# Patient Record
Sex: MALE | Race: WHITE | Employment: FULL TIME | URBAN - METROPOLITAN AREA
[De-identification: names, ages, dates, MRNs, and addresses within clinical notes are randomized per-mention and may not be internally consistent; named-entity substitution may affect disease eponyms.]

---

## 2017-08-11 ENCOUNTER — OFFICE VISIT (OUTPATIENT)
Dept: FAMILY MEDICINE CLINIC | Age: 22
End: 2017-08-11
Payer: COMMERCIAL

## 2017-08-11 VITALS
DIASTOLIC BLOOD PRESSURE: 75 MMHG | OXYGEN SATURATION: 96 % | HEART RATE: 100 BPM | HEIGHT: 71 IN | WEIGHT: 203 LBS | TEMPERATURE: 99.7 F | BODY MASS INDEX: 28.42 KG/M2 | SYSTOLIC BLOOD PRESSURE: 127 MMHG | RESPIRATION RATE: 18 BRPM

## 2017-08-11 DIAGNOSIS — R53.83 FATIGUE, UNSPECIFIED TYPE: ICD-10-CM

## 2017-08-11 DIAGNOSIS — K13.79 MUCOCELE OF MOUTH: ICD-10-CM

## 2017-08-11 DIAGNOSIS — K12.0 ORAL APHTHOUS ULCER: ICD-10-CM

## 2017-08-11 DIAGNOSIS — J06.9 UPPER RESPIRATORY TRACT INFECTION, UNSPECIFIED TYPE: ICD-10-CM

## 2017-08-11 DIAGNOSIS — J02.9 SORE THROAT: Primary | ICD-10-CM

## 2017-08-11 LAB — S PYO AG THROAT QL: NORMAL

## 2017-08-11 PROCEDURE — 99214 OFFICE O/P EST MOD 30 MIN: CPT | Performed by: FAMILY MEDICINE

## 2017-08-11 PROCEDURE — 87880 STREP A ASSAY W/OPTIC: CPT | Performed by: FAMILY MEDICINE

## 2017-08-11 RX ORDER — TRIAMCINOLONE ACETONIDE 0.1 %
PASTE (GRAM) DENTAL
Qty: 5 G | Refills: 2 | Status: SHIPPED | OUTPATIENT
Start: 2017-08-11 | End: 2020-02-13 | Stop reason: ALTCHOICE

## 2017-08-11 ASSESSMENT — ENCOUNTER SYMPTOMS
GASTROINTESTINAL NEGATIVE: 1
WHEEZING: 1
ALLERGIC/IMMUNOLOGIC NEGATIVE: 1
EYES NEGATIVE: 1
SORE THROAT: 1

## 2018-12-20 ENCOUNTER — OFFICE VISIT (OUTPATIENT)
Dept: FAMILY MEDICINE CLINIC | Age: 23
End: 2018-12-20
Payer: COMMERCIAL

## 2018-12-20 VITALS
BODY MASS INDEX: 28.98 KG/M2 | HEIGHT: 71 IN | TEMPERATURE: 98.5 F | SYSTOLIC BLOOD PRESSURE: 127 MMHG | HEART RATE: 83 BPM | OXYGEN SATURATION: 98 % | WEIGHT: 207 LBS | DIASTOLIC BLOOD PRESSURE: 85 MMHG | RESPIRATION RATE: 16 BRPM

## 2018-12-20 DIAGNOSIS — K04.7 DENTAL ABSCESS: Primary | ICD-10-CM

## 2018-12-20 PROCEDURE — 99214 OFFICE O/P EST MOD 30 MIN: CPT | Performed by: FAMILY MEDICINE

## 2018-12-20 PROCEDURE — S0077 INJECTION, CLINDAMYCIN PHOSP: HCPCS | Performed by: FAMILY MEDICINE

## 2018-12-20 RX ORDER — IBUPROFEN 800 MG/1
800 TABLET ORAL EVERY 6 HOURS PRN
Qty: 90 TABLET | Refills: 3 | Status: SHIPPED | OUTPATIENT
Start: 2018-12-20 | End: 2020-02-13 | Stop reason: SDUPTHER

## 2018-12-20 RX ORDER — CLINDAMYCIN PHOSPHATE 150 MG/ML
600 INJECTION, SOLUTION INTRAVENOUS ONCE
Status: COMPLETED | OUTPATIENT
Start: 2018-12-20 | End: 2018-12-20

## 2018-12-20 RX ORDER — AMOXICILLIN 250 MG/1
250 CAPSULE ORAL 3 TIMES DAILY
COMMUNITY
End: 2020-02-13 | Stop reason: ALTCHOICE

## 2018-12-20 RX ORDER — IBUPROFEN 800 MG/1
600 TABLET ORAL EVERY 6 HOURS PRN
COMMUNITY

## 2018-12-20 RX ORDER — CLINDAMYCIN HYDROCHLORIDE 150 MG/1
150 CAPSULE ORAL 3 TIMES DAILY
Qty: 21 CAPSULE | Refills: 0 | Status: SHIPPED | OUTPATIENT
Start: 2018-12-20 | End: 2018-12-27

## 2018-12-20 RX ADMIN — CLINDAMYCIN PHOSPHATE 600 MG: 150 INJECTION, SOLUTION INTRAVENOUS at 13:10

## 2018-12-20 ASSESSMENT — ENCOUNTER SYMPTOMS
EYES NEGATIVE: 1
RESPIRATORY NEGATIVE: 1
GASTROINTESTINAL NEGATIVE: 1
ALLERGIC/IMMUNOLOGIC NEGATIVE: 1

## 2018-12-20 ASSESSMENT — PATIENT HEALTH QUESTIONNAIRE - PHQ9
SUM OF ALL RESPONSES TO PHQ9 QUESTIONS 1 & 2: 0
2. FEELING DOWN, DEPRESSED OR HOPELESS: 0
1. LITTLE INTEREST OR PLEASURE IN DOING THINGS: 0
SUM OF ALL RESPONSES TO PHQ QUESTIONS 1-9: 0
SUM OF ALL RESPONSES TO PHQ QUESTIONS 1-9: 0

## 2018-12-20 NOTE — PROGRESS NOTES
normal.   Abdominal: Soft. Bowel sounds are normal.   Musculoskeletal: Normal range of motion. Neurological: He is alert and oriented to person, place, and time. He has normal reflexes. Skin: Skin is warm and dry. Psychiatric: He has a normal mood and affect. His behavior is normal. Judgment and thought content normal.   Nursing note and vitals reviewed. /85 (Site: Left Upper Arm, Position: Sitting, Cuff Size: Large Adult)   Pulse 83   Temp 98.5 °F (36.9 °C) (Oral)   Resp 16   Ht 5' 11\" (1.803 m)   Wt 207 lb (93.9 kg)   SpO2 98%   BMI 28.87 kg/m²     Assessment:       Diagnosis Orders   1. Dental abscess  clindamycin (CLEOCIN) injection 600 mg    clindamycin (CLEOCIN) 150 MG capsule    ibuprofen (ADVIL;MOTRIN) 800 MG tablet             Plan:      Return if symptoms worsen or fail to improve. Patient will follow-up with his dentist when necessary. We will stop the amoxicillin and use clindamycin. No orders of the defined types were placed in this encounter. Orders Placed This Encounter   Medications    clindamycin (CLEOCIN) injection 600 mg    clindamycin (CLEOCIN) 150 MG capsule     Sig: Take 1 capsule by mouth 3 times daily for 7 days     Dispense:  21 capsule     Refill:  0    ibuprofen (ADVIL;MOTRIN) 800 MG tablet     Sig: Take 1 tablet by mouth every 6 hours as needed for Pain     Dispense:  90 tablet     Refill:  3       Patient given educational materials - see patient instructions. Discussed use, benefit, and side effects of prescribed medications. All patientquestions answered. Pt voiced understanding. Reviewed health maintenance. Instructedto continue current medications, diet and exercise. Patient agreed with treatmentplan. Follow up as directed.      Electronically signed by Mariah Pearson MD on 12/20/2018 at 1:07 PM

## 2020-02-12 ENCOUNTER — NURSE TRIAGE (OUTPATIENT)
Dept: OTHER | Facility: CLINIC | Age: 25
End: 2020-02-12

## 2020-02-13 ENCOUNTER — OFFICE VISIT (OUTPATIENT)
Dept: FAMILY MEDICINE CLINIC | Age: 25
End: 2020-02-13
Payer: COMMERCIAL

## 2020-02-13 ENCOUNTER — HOSPITAL ENCOUNTER (OUTPATIENT)
Age: 25
Discharge: HOME | End: 2020-02-15
Payer: COMMERCIAL

## 2020-02-13 ENCOUNTER — HOSPITAL ENCOUNTER (OUTPATIENT)
Dept: GENERAL RADIOLOGY | Age: 25
Discharge: HOME | End: 2020-02-15
Payer: COMMERCIAL

## 2020-02-13 VITALS
HEART RATE: 79 BPM | HEIGHT: 70 IN | DIASTOLIC BLOOD PRESSURE: 72 MMHG | SYSTOLIC BLOOD PRESSURE: 112 MMHG | OXYGEN SATURATION: 98 % | BODY MASS INDEX: 31.64 KG/M2 | TEMPERATURE: 97.9 F | WEIGHT: 221 LBS

## 2020-02-13 PROCEDURE — 73562 X-RAY EXAM OF KNEE 3: CPT

## 2020-02-13 PROCEDURE — 99385 PREV VISIT NEW AGE 18-39: CPT | Performed by: NURSE PRACTITIONER

## 2020-02-13 ASSESSMENT — ENCOUNTER SYMPTOMS
SHORTNESS OF BREATH: 0
SORE THROAT: 0
ABDOMINAL PAIN: 0
CHEST TIGHTNESS: 0
COUGH: 0
ABDOMINAL DISTENTION: 0
RHINORRHEA: 0
CONSTIPATION: 0
BACK PAIN: 0
NAUSEA: 0
DIARRHEA: 0
VOMITING: 0

## 2020-02-13 ASSESSMENT — PATIENT HEALTH QUESTIONNAIRE - PHQ9
2. FEELING DOWN, DEPRESSED OR HOPELESS: 0
SUM OF ALL RESPONSES TO PHQ QUESTIONS 1-9: 0
SUM OF ALL RESPONSES TO PHQ QUESTIONS 1-9: 0
SUM OF ALL RESPONSES TO PHQ9 QUESTIONS 1 & 2: 0
1. LITTLE INTEREST OR PLEASURE IN DOING THINGS: 0

## 2020-02-13 NOTE — PROGRESS NOTES
Visit Information    Have you changed or started any medications since your last visit including any over-the-counter medicines, vitamins, or herbal medicines? no   Are you having any side effects from any of your medications? -  no  Have you stopped taking any of your medications? Is so, why? -  no    Have you seen any other physician or provider since your last visit? No  Have you had any other diagnostic tests since your last visit? No  Have you been seen in the emergency room and/or had an admission to a hospital since we last saw you? No  Have you had your routine dental cleaning in the past 6 months? no    Have you activated your [employer] account? If not, what are your barriers?  Yes     Patient Care Team:  BENNETT Bell - CNP as PCP - General (Nurse Practitioner Family)    Medical History Review  Past Medical, Family, and Social History reviewed and does not contribute to the patient presenting condition    Health Maintenance   Topic Date Due    Flu vaccine (1) 02/13/2021 (Originally 9/1/2019)    Pneumococcal 0-64 years Vaccine (1 of 1 - PPSV23) 02/13/2021 (Originally 10/13/2000)    DTaP/Tdap/Td vaccine (2 - Td) 11/08/2026    Shingles Vaccine (1 of 2) 10/13/2044    HIV screen  Completed    Hepatitis A vaccine  Aged Out    Hepatitis B vaccine  Aged Out    Hib vaccine  Aged Out    HPV vaccine  Aged Out    Meningococcal (ACWY) vaccine  Aged Out    Varicella vaccine  Discontinued

## 2020-02-13 NOTE — LETTER
[facility]  [address]  [facility] [address]  Phone: [phone]  Fax: BENNETT Pacheco CNP        February 13, 2020     Patient: Gin Hagen   YOB: 1995   Date of Visit: 2/13/2020       To Whom it May Concern:    Sabinefabrizio Don was seen in my clinic on 2/13/2020. He {Return to school/sport/work:15549}. If you have any questions or concerns, please don't hesitate to call.     Sincerely,         BENNETT Tyler CNP

## 2020-02-13 NOTE — PROGRESS NOTES
Endocrine: Negative for polydipsia, polyphagia and polyuria. Genitourinary: Negative for difficulty urinating and dysuria. Musculoskeletal: Negative for arthralgias (stable), back pain and myalgias (stable). Right wrist pain, numbness noted to left thumb  Lump on right knee   Skin: Negative for rash. Neurological: Negative for dizziness, weakness, light-headedness and headaches. Hematological: Negative for adenopathy. Psychiatric/Behavioral: Negative for agitation and behavioral problems. The patient is not nervous/anxious. Objective:     Physical Exam  Vitals signs reviewed. Constitutional:       General: He is not in acute distress. Appearance: Normal appearance. HENT:      Head: Normocephalic and atraumatic. Right Ear: External ear normal.      Left Ear: External ear normal.      Nose: Nose normal.      Mouth/Throat:      Mouth: Mucous membranes are moist.      Pharynx: No oropharyngeal exudate or posterior oropharyngeal erythema. Eyes:      Extraocular Movements: Extraocular movements intact. Conjunctiva/sclera: Conjunctivae normal.      Pupils: Pupils are equal, round, and reactive to light. Neck:      Musculoskeletal: Normal range of motion. Cardiovascular:      Rate and Rhythm: Normal rate and regular rhythm. Pulses: Normal pulses. Heart sounds: No murmur. Pulmonary:      Effort: Pulmonary effort is normal. No respiratory distress. Breath sounds: Normal breath sounds. No wheezing, rhonchi or rales. Abdominal:      General: Bowel sounds are normal. There is no distension. Palpations: Abdomen is soft. Musculoskeletal: Normal range of motion. Right wrist: He exhibits tenderness (occasional). He exhibits normal range of motion. Arms:         Legs:    Skin:     General: Skin is warm and dry. Findings: No rash. Neurological:      General: No focal deficit present.       Mental Status: He is alert and oriented to person, place, and time. Deep Tendon Reflexes: Reflexes normal.   Psychiatric:         Behavior: Behavior normal.         I have reviewed all the lab results dating back to 2016. There are some abnormalities that are not critical to the patient's health, but did discuss them with him at this visit    Assessment:      Diagnosis Orders   1. Annual visit for general adult medical examination without abnormal findings  CBC    Comprehensive Metabolic Panel    Hemoglobin A1C    Lipid Panel   2. Acute pain of right knee  XR KNEE RIGHT (3 VIEWS)   3. Right wrist pain     4. Numbness and tingling of left thumb          Plan:     1. Annual visit for general adult medical examination without abnormal findings/ Encounter for annual physical exam  -Will order above noted labs and call with results  - CBC; Future  - Comprehensive Metabolic Panel; Future  - Hemoglobin A1C; Future  - Lipid Panel; Future    Health maintenance recommendations were discussed at length during this visit. Topics include, but are not limited to:    Diet- (healthy well balanced diet full of fruits and vegetables, the avoidance of carbonated drinks, fruit juices and limit alcohol use).  Exercise-(150 minutes of physical activity per week, get your heart rate up to 100 or more).  Skin care- (Use a sunblock on all of your exposed skin, SPF 30 or higher sun screen, Apply 20 to 30 minutes before going out in the sun. Reapply sunblock every 2 hours and after swimming or sweating).  Sleep- (achieve a regular sleep schedule, waking and laying down at the same time each night).  Personal health- (smoking cessation, safe sex, preventative dental health, vision screenings, seatbelt education, and the importance of the flu vaccine).  Psychosocial health- (maintaining a positive focus on life, an having something to look forward to).     We did discuss in detail the and the recommended preventative screening guidelines (HTN, lipid, DM, breast, cervical cancer, prostate, colorectal and osteoporosis). Detailed education was provided on the patient's after visit summary. 2. Acute pain of right knee  -Will order x-ray and call with results  - XR KNEE RIGHT (3 VIEWS); Future    3. Right wrist pain (occasional)  4. Numbness and tingling of left thumb  - Phalen's and Tenel's test both negative  -I did discuss with him that the repetitive movements at work for long periods of time could exacerbate a nerve. I did encourage rest and ice as well as ibuprofen  -The patient was advised that NSAID-type medications have two very important potential side effects: gastrointestinal irritation including hemorrhage and renal injuries. He was asked to take the medication with food and to stop if he experiences any GI upset. I asked him to call for vomiting, abdominal pain or black/bloody stools. The patient expresses understanding of these issues and questions were answered. - Medications, labs, diagnostic studies, consultations and follow-up as documented in this encounter.  Rest of systems unchanged, continue current treatments     BENNETT Valentine-CNP

## 2020-02-13 NOTE — PATIENT INSTRUCTIONS
to 30 minutes before going out in the sun. Reapply sunblock every 2 hours and after swimming or sweating. Iliana Alex can also damage your skin on cool, windy days. Clouds and fog do not filter UV light. Make sure you reapply sunblock every 2 hours. · Avoid the sun in the middle of the day, between 10 AM to 4 PM. Your unprotected skin can be damaged in 15 minutes with direct sun exposure. Personal Health  · If you smoke, STOP. There are many resources available to help you successfully quit. · If you are sexually active, always practice safe sex and wear a condom. · See a dentist every 6 months. · See an eye doctor regularly. · Always wear a seat belt while in car. · Get a flu vaccine annually. · Get a tetanus booster vaccine every 10 years. Psychosocial Health  · Finally, make sure that you always have something to look forward to whether this is a vacation, a special event, or just a weekend off work. Having something to look forward to helps to maintain positive focus and is good for mental health.    Preventative Screening Guidelines   Hypertension (Blood Pressure) screening - every 1-2 years for all men and women regardless of age  [facility] Lipid (Cholesterol) screening - every 5 years for all men and women 28 and over   Diabetes screening - every 3 years for all men and women 39 and over   Breast cancer screening/Mammogram -  annually for women ages 36 and over   Cervical cancer sccreening/PAP smear/HPV testing every 3 years- women 21-65    Colorectal cancer screening (Cologuard 3 years, Colonoscopy 10 years) - for men and women ages 54-65   Prostate cancer screening - for men 48 and over or age 36 for  Americans    Osteoporosis screening - for women ages 72 and over/DXA scan based findings

## 2020-02-14 ENCOUNTER — HOSPITAL ENCOUNTER (OUTPATIENT)
Age: 25
Discharge: HOME | End: 2020-02-14
Payer: COMMERCIAL

## 2020-02-14 LAB
ALBUMIN SERPL-MCNC: 4.8 G/DL (ref 3.5–5.2)
ALBUMIN/GLOBULIN RATIO: NORMAL (ref 1–2.5)
ALP BLD-CCNC: 80 U/L (ref 40–129)
ALT SERPL-CCNC: 24 U/L (ref 5–41)
ANION GAP SERPL CALCULATED.3IONS-SCNC: 14 MMOL/L (ref 9–17)
AST SERPL-CCNC: 16 U/L
BILIRUB SERPL-MCNC: 0.35 MG/DL (ref 0.3–1.2)
BUN BLDV-MCNC: 15 MG/DL (ref 6–20)
BUN/CREAT BLD: NORMAL (ref 9–20)
CALCIUM SERPL-MCNC: 9.5 MG/DL (ref 8.6–10.4)
CHLORIDE BLD-SCNC: 104 MMOL/L (ref 98–107)
CHOLESTEROL/HDL RATIO: 3.9
CHOLESTEROL: 140 MG/DL
CO2: 25 MMOL/L (ref 20–31)
CREAT SERPL-MCNC: 0.79 MG/DL (ref 0.7–1.2)
ESTIMATED AVERAGE GLUCOSE: 88 MG/DL
GFR AFRICAN AMERICAN: >60 ML/MIN
GFR NON-AFRICAN AMERICAN: >60 ML/MIN
GFR SERPL CREATININE-BSD FRML MDRD: NORMAL ML/MIN/{1.73_M2}
GFR SERPL CREATININE-BSD FRML MDRD: NORMAL ML/MIN/{1.73_M2}
GLUCOSE BLD-MCNC: 95 MG/DL (ref 70–99)
HBA1C MFR BLD: 4.7 % (ref 4–6)
HCT VFR BLD CALC: 49.6 % (ref 41–53)
HDLC SERPL-MCNC: 36 MG/DL
HEMOGLOBIN: 17.3 G/DL (ref 13.5–17.5)
LDL CHOLESTEROL: 76 MG/DL (ref 0–130)
MCH RBC QN AUTO: 29.5 PG (ref 26–34)
MCHC RBC AUTO-ENTMCNC: 34.8 G/DL (ref 31–37)
MCV RBC AUTO: 84.7 FL (ref 80–100)
NRBC AUTOMATED: NORMAL PER 100 WBC
PDW BLD-RTO: 13.2 % (ref 11.5–14.9)
PLATELET # BLD: 275 K/UL (ref 150–450)
PMV BLD AUTO: 7.4 FL (ref 6–12)
POTASSIUM SERPL-SCNC: 4.3 MMOL/L (ref 3.7–5.3)
RBC # BLD: 5.85 M/UL (ref 4.5–5.9)
SODIUM BLD-SCNC: 143 MMOL/L (ref 135–144)
TOTAL PROTEIN: 7.1 G/DL (ref 6.4–8.3)
TRIGL SERPL-MCNC: 140 MG/DL
VLDLC SERPL CALC-MCNC: ABNORMAL MG/DL (ref 1–30)
WBC # BLD: 10.6 K/UL (ref 3.5–11)

## 2020-02-14 PROCEDURE — 80053 COMPREHEN METABOLIC PANEL: CPT

## 2020-02-14 PROCEDURE — 83036 HEMOGLOBIN GLYCOSYLATED A1C: CPT

## 2020-02-14 PROCEDURE — 80061 LIPID PANEL: CPT

## 2020-02-14 PROCEDURE — 36415 COLL VENOUS BLD VENIPUNCTURE: CPT

## 2020-02-14 PROCEDURE — 85027 COMPLETE CBC AUTOMATED: CPT

## 2020-02-16 NOTE — PROGRESS NOTES
Carlos Pugh- does this patient still need a work excuse letter? Looks like it automatically went under my name even though he saw you and there were no dates on it. I deleted it just FYI.

## 2020-10-10 ENCOUNTER — HOSPITAL ENCOUNTER (OUTPATIENT)
Dept: GENERAL RADIOLOGY | Age: 25
Discharge: HOME | End: 2020-10-12
Payer: COMMERCIAL

## 2020-10-10 ENCOUNTER — HOSPITAL ENCOUNTER (OUTPATIENT)
Age: 25
Discharge: HOME | End: 2020-10-12
Payer: COMMERCIAL

## 2020-10-10 ENCOUNTER — OFFICE VISIT (OUTPATIENT)
Dept: PRIMARY CARE CLINIC | Age: 25
End: 2020-10-10
Payer: COMMERCIAL

## 2020-10-10 VITALS
HEIGHT: 70 IN | DIASTOLIC BLOOD PRESSURE: 83 MMHG | HEART RATE: 68 BPM | TEMPERATURE: 99.2 F | WEIGHT: 222 LBS | SYSTOLIC BLOOD PRESSURE: 123 MMHG | OXYGEN SATURATION: 98 % | BODY MASS INDEX: 31.78 KG/M2

## 2020-10-10 PROCEDURE — 72040 X-RAY EXAM NECK SPINE 2-3 VW: CPT

## 2020-10-10 PROCEDURE — 99214 OFFICE O/P EST MOD 30 MIN: CPT | Performed by: FAMILY MEDICINE

## 2020-10-10 RX ORDER — CYCLOBENZAPRINE HCL 10 MG
10 TABLET ORAL 3 TIMES DAILY PRN
Qty: 15 TABLET | Refills: 0 | Status: SHIPPED | OUTPATIENT
Start: 2020-10-10 | End: 2021-07-15 | Stop reason: ALTCHOICE

## 2020-10-10 ASSESSMENT — PATIENT HEALTH QUESTIONNAIRE - PHQ9
1. LITTLE INTEREST OR PLEASURE IN DOING THINGS: 0
SUM OF ALL RESPONSES TO PHQ9 QUESTIONS 1 & 2: 0
SUM OF ALL RESPONSES TO PHQ QUESTIONS 1-9: 0
SUM OF ALL RESPONSES TO PHQ QUESTIONS 1-9: 0
2. FEELING DOWN, DEPRESSED OR HOPELESS: 0

## 2020-10-10 ASSESSMENT — ENCOUNTER SYMPTOMS
BACK PAIN: 0
PHOTOPHOBIA: 0
ABDOMINAL PAIN: 0
DIARRHEA: 0
TROUBLE SWALLOWING: 0
VOMITING: 0
VISUAL CHANGE: 0
RESPIRATORY NEGATIVE: 1
NAUSEA: 0

## 2020-10-10 NOTE — PROGRESS NOTES
[address]  [address] [address]  Dept: [phone]  Dept Fax: [phone]    Bernardo Gamble is a 21 y.o. male who presents today for his medical conditions/complaintsas noted below. Say Holly is c/o of Neck Pain (sharp pain from neck into right shoulder x1day)        HPI:     Neck Pain    This is a new problem. The current episode started yesterday. The problem occurs constantly. The problem has been gradually worsening (started having achy neck yesterday and then woke up with more severe pain). The pain is associated with an unknown (Does lift heavy items from time to time but cannot think of specific trigger to this pain. Pain worsened overnight while sleeping) factor. The pain is present in the right side. The quality of the pain is described as cramping and aching. The pain is at a severity of 8/10. The pain is severe. The symptoms are aggravated by twisting and position. Associated symptoms include tingling (shoulder area). Pertinent negatives include no chest pain, fever, numbness, pain with swallowing, photophobia, trouble swallowing, visual change or weakness. He has tried NSAIDs and heat for the symptoms. The treatment provided mild relief.        Past Medical History:   Diagnosis Date    Acute bronchiolitis     Has a history of acute bronchiolitis    Asthma     Past medical history reviewed and pertinent positives/negatives in the HPI    Past Surgical History:   Procedure Laterality Date    OTHER SURGICAL HISTORY      left foot, great toe. toe nail removal 2006    WISDOM TOOTH EXTRACTION         Family History   Problem Relation Age of Onset    High Blood Pressure Mother     COPD Maternal Grandmother        Social History     Tobacco Use    Smoking status: Never Smoker    Smokeless tobacco: Former User    Tobacco comment: former Hookah smoker   Substance Use Topics    Alcohol use: No     Comment: socially Current Outpatient Medications   Medication Sig Dispense Refill    cyclobenzaprine (FLEXERIL) 10 MG tablet Take 1 tablet by mouth 3 times daily as needed for Muscle spasms 15 tablet 0    ibuprofen (ADVIL;MOTRIN) 800 MG tablet Take 600 mg by mouth every 6 hours as needed for Pain        No current facility-administered medications for this visit. No Known Allergies    Health Maintenance   Topic Date Due    HPV vaccine (1 - Male 2-dose series) 10/13/2005    Flu vaccine (1) 09/01/2020    DTaP/Tdap/Td vaccine (2 - Td) 11/08/2026    HIV screen  Completed    Hepatitis A vaccine  Aged Out    Hepatitis B vaccine  Aged Out    Hib vaccine  Aged Out    Meningococcal (ACWY) vaccine  Aged Out    Pneumococcal 0-64 years Vaccine  Aged Out    Varicella vaccine  Discontinued       :      Review of Systems   Constitutional: Negative for chills and fever. HENT: Negative. Negative for trouble swallowing. Eyes: Negative for photophobia. Respiratory: Negative. Cardiovascular: Negative for chest pain. Gastrointestinal: Negative for abdominal pain, diarrhea, nausea and vomiting. Musculoskeletal: Positive for neck pain. Negative for back pain and joint swelling. Skin: Negative for pallor and rash. Neurological: Positive for tingling (shoulder area). Negative for weakness and numbness. Hematological: Negative for adenopathy. Objective:     Physical Exam  Vitals signs and nursing note reviewed. Constitutional:       Appearance: Normal appearance. He is obese. HENT:      Head: Normocephalic and atraumatic. Right Ear: Hearing normal.      Left Ear: Hearing normal.      Nose: Nose normal.      Mouth/Throat:      Lips: Pink. Eyes:      Extraocular Movements: Extraocular movements intact. Conjunctiva/sclera: Conjunctivae normal.   Neck:      Musculoskeletal: Normal range of motion. No muscular tenderness. Cardiovascular:      Rate and Rhythm: Normal rate and regular rhythm.       Heart sounds: Normal heart sounds. Pulmonary:      Effort: Pulmonary effort is normal.      Breath sounds: Normal breath sounds. Musculoskeletal: Normal range of motion. General: No swelling, tenderness (non-tender to palpation), deformity or signs of injury. Comments: Pain of right neck/trapezius is exacerbated with compound movement involving right shoulder and neck as well as turning head to the right side   Lymphadenopathy:      Cervical: No cervical adenopathy. Skin:     General: Skin is warm and dry. Neurological:      Mental Status: He is alert and oriented to person, place, and time. Mental status is at baseline. Psychiatric:         Mood and Affect: Mood normal.         Behavior: Behavior normal.         Thought Content: Thought content normal.         Judgment: Judgment normal.       /83   Pulse 68   Temp 99.2 °F (37.3 °C) (Infrared)   Ht 5' 10\" (1.778 m)   Wt 222 lb (100.7 kg)   SpO2 98%   BMI 31.85 kg/m²     Assessment:       Diagnosis Orders   1. Neck muscle spasm  XR CERVICAL SPINE (2-3 VIEWS)   2. Neck pain  XR CERVICAL SPINE (2-3 VIEWS)       Plan:    May take muscle relaxer, tylenol/motrin, apply heat as needed for neck pain. Try doing neck exercises/stretches several times daily  If pain persists then have xray of neck  If symptoms worsen or do not improve please follow-up with PCP or return to clinic      Orders Placed This Encounter   Procedures    XR CERVICAL SPINE (2-3 VIEWS)     Standing Status:   Future     Standing Expiration Date:   10/10/2021     Order Specific Question:   Reason for exam:     Answer:   neck pain     Orders Placed This Encounter   Medications    cyclobenzaprine (FLEXERIL) 10 MG tablet     Sig: Take 1 tablet by mouth 3 times daily as needed for Muscle spasms     Dispense:  15 tablet     Refill:  0      Patient given educational materials - see patient instructions. Discussed use, benefit, and side effects of prescribed medications.   All patient questions answered. Pt voiced understanding. Patient agreed with treatment plan. Follow up as directed.      Electronicallysigned by Prasad Bhatti MD on 10/10/2020 at 11:07 AM

## 2020-10-10 NOTE — PATIENT INSTRUCTIONS
Patient Education        Neck Pain: Care Instructions  Your Care Instructions     You can have neck pain anywhere from the bottom of your head to the top of your shoulders. It can spread to the upper back or arms. Injuries, painting a ceiling, sleeping with your neck twisted, staying in one position for too long, and many other activities can cause neck pain. Most neck pain gets better with home care. Your doctor may recommend medicine to relieve pain or relax your muscles. He or she may suggest exercise and physical therapy to increase flexibility and relieve stress. You may need to wear a special (cervical) collar to support your neck for a day or two. Follow-up care is a key part of your treatment and safety. Be sure to make and go to all appointments, and call your doctor if you are having problems. It's also a good idea to know your test results and keep a list of the medicines you take. How can you care for yourself at home? · Try using a heating pad on a low or medium setting for 15 to 20 minutes every 2 or 3 hours. Try a warm shower in place of one session with the heating pad. · You can also try an ice pack for 10 to 15 minutes every 2 to 3 hours. Put a thin cloth between the ice and your skin. · Take pain medicines exactly as directed. ? If the doctor gave you a prescription medicine for pain, take it as prescribed. ? If you are not taking a prescription pain medicine, ask your doctor if you can take an over-the-counter medicine. · If your doctor recommends a cervical collar, wear it exactly as directed. When should you call for help? Call your doctor now or seek immediate medical care if:    · You have new or worsening numbness in your arms, buttocks or legs.     · You have new or worsening weakness in your arms or legs. (This could make it hard to stand up.)     · You lose control of your bladder or bowels.    Watch closely for changes in your health, and be sure to contact your doctor if:    · Your neck pain is getting worse.     · You are not getting better after 1 week.     · You do not get better as expected. Where can you learn more? Go to https://chpearmaan.Booshaka. org and sign in to your [employer] account. Enter [id number] in the [facility] box to learn more about \"Neck Pain: Care Instructions. \"     If you do not have an account, please click on the \"Sign Up Now\" link. Current as of: March 2, 2020               Content Version: 12.6  © 2006-2020 [email or web address], [employer]. Care instructions adapted under license by [facility] ([facility]). If you have questions about a medical condition or this instruction, always ask your healthcare professional. [place] [address] any warranty or liability for your use of this information. Patient Education        Neck: Exercises  Introduction  Here are some examples of exercises for you to try. The exercises may be suggested for a condition or for rehabilitation. Start each exercise slowly. Ease off the exercises if you start to have pain. You will be told when to start these exercises and which ones will work best for you. How to do the exercises  Neck stretch   1. This stretch works best if you keep your shoulder down as you lean away from it. To help you remember to do this, start by relaxing your shoulders and lightly holding on to your thighs or your chair. 2. Tilt your head toward your shoulder and hold for 15 to 30 seconds. Let the weight of your head stretch your muscles. 3. If you would like a little added stretch, use your hand to gently and steadily pull your head toward your shoulder. For example, keeping your right shoulder down, lean your head to the left. 4. Repeat 2 to 4 times toward each shoulder. Diagonal neck stretch   1. Turn your head slightly toward the direction you will be stretching, and tilt your head diagonally toward your chest and hold for 15 to 30 seconds.   2. If you would like a little added stretch, use your hand to gently and steadily pull your head forward on the diagonal.  3. Repeat 2 to 4 times toward each side. Dorsal glide stretch   The dorsal glide stretches the back of the neck. If you feel pain, do not glide so far back. Some people find this exercise easier to do while lying on their backs with an ice pack on the neck. 1. Sit or stand tall and look straight ahead. 2. Slowly tuck your chin as you glide your head backward over your body  3. Hold for a count of 6, and then relax for up to 10 seconds. 4. Repeat 8 to 12 times. Chest and shoulder stretch   1. Sit or stand tall and glide your head backward as in the dorsal glide stretch. 2. Raise both arms so that your hands are next to your ears. 3. Take a deep breath, and as you breathe out, lower your elbows down and behind your back. You will feel your shoulder blades slide down and together, and at the same time you will feel a stretch across your chest and the front of your shoulders. 4. Hold for about 6 seconds, and then relax for up to 10 seconds. 5. Repeat 8 to 12 times. Strengthening: Hands on head   1. Move your head backward, forward, and side to side against gentle pressure from your hands, holding each position for about 6 seconds. 2. Repeat 8 to 12 times. Follow-up care is a key part of your treatment and safety. Be sure to make and go to all appointments, and call your doctor if you are having problems. It's also a good idea to know your test results and keep a list of the medicines you take. Where can you learn more? Go to https://CloudRunner I/OmillaTripbod.iAdvize. org and sign in to your [employer] account. Enter P975 in the [employer] box to learn more about \"Neck: Exercises. \"     If you do not have an account, please click on the \"Sign Up Now\" link. Current as of: March 2, 2020               Content Version: 12.6  © 1094-8716 [employer].    Care instructions adapted under license by [facility] ([facility]). If you have questions about a medical condition or this instruction, always ask your healthcare professional. [address] any warranty or liability for your use of this information. May take muscle relaxer, tylenol/motrin, apply heat as needed for neck pain.   Try doing neck exercises/stretches several times daily  If pain persists over next few days despite muscle relaxer and other symptomatic treatment then have xray of neck  If symptoms worsen or do not improve please follow-up with PCP or return to clinic

## 2020-10-10 NOTE — LETTER
[address] [place] [id number]  Phone: [phone]  Fax: [phone]    Zahra Arzate MD        October 10, 2020     Patient: Rod العلي   YOB: 1995   Date of Visit: 10/10/2020       To Whom it May Concern:    Cristian Roberts was seen in my clinic on 10/10/2020. He is to be excused from work 10/10/20-10/11/20. If you have any questions or concerns, please don't hesitate to call.     Sincerely,           Zahra Arzate MD

## 2021-01-06 ENCOUNTER — NURSE TRIAGE (OUTPATIENT)
Dept: OTHER | Facility: CLINIC | Age: 26
End: 2021-01-06

## 2021-01-06 NOTE — TELEPHONE ENCOUNTER
out of the country in the last month? \" (e.g., travel history, exposures)        Denies    Protocols used: BREATHING DIFFICULTY-ADULT-[place]    Patient called pre-service center [facility]) to schedule appointment, with red flag complaint, transferred to RN access for triage. See above questions and answers. Caller talking full sentences without any distress on phone. Discussed disposition and patient agreeable. Discussed potential consequences for not following disposition recommendation. Aware to call back with any concerns or persistent, worsening, or new symptoms develop. Warm transfer to [facility] scheduling for appointment. Attention Provider: Thank you for allowing me to participate in the care of your patient. The  patient was connected to triage in response to information provided to the ECC. Please do not respond through this encounter as the response is not directed to a shared pool.

## 2021-07-15 ENCOUNTER — OFFICE VISIT (OUTPATIENT)
Dept: FAMILY MEDICINE CLINIC | Age: 26
End: 2021-07-15
Payer: COMMERCIAL

## 2021-07-15 VITALS
BODY MASS INDEX: 32.07 KG/M2 | SYSTOLIC BLOOD PRESSURE: 136 MMHG | DIASTOLIC BLOOD PRESSURE: 88 MMHG | TEMPERATURE: 98.1 F | HEIGHT: 70 IN | HEART RATE: 74 BPM | WEIGHT: 224 LBS | OXYGEN SATURATION: 98 %

## 2021-07-15 DIAGNOSIS — Z00.00 PREVENTATIVE HEALTH CARE: Primary | ICD-10-CM

## 2021-07-15 DIAGNOSIS — R53.83 FATIGUE, UNSPECIFIED TYPE: ICD-10-CM

## 2021-07-15 DIAGNOSIS — J45.20 INTERMITTENT ASTHMA, UNSPECIFIED ASTHMA SEVERITY, UNSPECIFIED WHETHER COMPLICATED: ICD-10-CM

## 2021-07-15 PROCEDURE — G8417 CALC BMI ABV UP PARAM F/U: HCPCS | Performed by: NURSE PRACTITIONER

## 2021-07-15 PROCEDURE — G8427 DOCREV CUR MEDS BY ELIG CLIN: HCPCS | Performed by: NURSE PRACTITIONER

## 2021-07-15 PROCEDURE — 99214 OFFICE O/P EST MOD 30 MIN: CPT | Performed by: NURSE PRACTITIONER

## 2021-07-15 PROCEDURE — 1036F TOBACCO NON-USER: CPT | Performed by: NURSE PRACTITIONER

## 2021-07-15 RX ORDER — ALBUTEROL SULFATE 90 UG/1
2 AEROSOL, METERED RESPIRATORY (INHALATION) 4 TIMES DAILY PRN
Qty: 3 INHALER | Refills: 1 | Status: SHIPPED | OUTPATIENT
Start: 2021-07-15

## 2021-07-15 RX ORDER — BUDESONIDE AND FORMOTEROL FUMARATE DIHYDRATE 160; 4.5 UG/1; UG/1
2 AEROSOL RESPIRATORY (INHALATION) 2 TIMES DAILY
Qty: 3 INHALER | Refills: 1 | Status: SHIPPED | OUTPATIENT
Start: 2021-07-15 | End: 2021-08-05 | Stop reason: ALTCHOICE

## 2021-07-15 SDOH — ECONOMIC STABILITY: FOOD INSECURITY: WITHIN THE PAST 12 MONTHS, THE FOOD YOU BOUGHT JUST DIDN'T LAST AND YOU DIDN'T HAVE MONEY TO GET MORE.: NEVER TRUE

## 2021-07-15 SDOH — ECONOMIC STABILITY: FOOD INSECURITY: WITHIN THE PAST 12 MONTHS, YOU WORRIED THAT YOUR FOOD WOULD RUN OUT BEFORE YOU GOT MONEY TO BUY MORE.: NEVER TRUE

## 2021-07-15 ASSESSMENT — ENCOUNTER SYMPTOMS
SORE THROAT: 0
BACK PAIN: 0
DIARRHEA: 0
ABDOMINAL PAIN: 0
RHINORRHEA: 0
SHORTNESS OF BREATH: 1
VOMITING: 0
WHEEZING: 0
COUGH: 1
CONSTIPATION: 0
ABDOMINAL DISTENTION: 0
NAUSEA: 0
CHEST TIGHTNESS: 0

## 2021-07-15 ASSESSMENT — PATIENT HEALTH QUESTIONNAIRE - PHQ9
2. FEELING DOWN, DEPRESSED OR HOPELESS: 0
SUM OF ALL RESPONSES TO PHQ QUESTIONS 1-9: 0
SUM OF ALL RESPONSES TO PHQ QUESTIONS 1-9: 0
1. LITTLE INTEREST OR PLEASURE IN DOING THINGS: 0
SUM OF ALL RESPONSES TO PHQ9 QUESTIONS 1 & 2: 0
SUM OF ALL RESPONSES TO PHQ QUESTIONS 1-9: 0

## 2021-07-15 ASSESSMENT — SOCIAL DETERMINANTS OF HEALTH (SDOH): HOW HARD IS IT FOR YOU TO PAY FOR THE VERY BASICS LIKE FOOD, HOUSING, MEDICAL CARE, AND HEATING?: NOT HARD AT ALL

## 2021-07-15 NOTE — PROGRESS NOTES
Katie Carlos, APRN-CNP  [address] [facility]  [address] [address], [address]  [address] [address]  Dept: [phone]  Dept Fax: [phone]     PATIENT ID: Vonnie Carrera is a 31 y.o. male. HPI:  Established pt here today for an acute visit secondary to increasing shortness of breath and dry cough. 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 He did have a pulmonary function test back in 2016 for similar complaints and it was negative. He relates that although the study read normal, he felt he didn't do well with it. He also relates that he tested positive for COVID back in October of last year and since then, his symptoms have worsened. He has used his albuterol inhaler a couple of times but relates that it is . Pt denies any fever or chills. Pt today denies any HA, chest pain, or SOB. Pt denies any N/V/D/C or abdominal pain. He also complains of increasing fatigue. My previous office notes, labs and diagnostic studies were reviewed prior to and during encounter. The patient's past medical, surgical, social, and family history as well as current medications and allergies were reviewed as documented in today's encounter by KANDY Davila. Current Outpatient Medications on File Prior to Visit   Medication Sig Dispense Refill    ibuprofen (ADVIL;MOTRIN) 800 MG tablet Take 600 mg by mouth every 6 hours as needed for Pain        No current facility-administered medications on file prior to visit. SUBJECTIVE:     Review of Systems   Constitutional: Positive for fever. Negative for activity change and fatigue. HENT: Negative for congestion, ear pain, rhinorrhea and sore throat. Respiratory: Positive for cough and shortness of breath (with exertion). Negative for chest tightness and wheezing (denies).     Cardiovascular: Negative for chest pain and palpitations. Gastrointestinal: Negative for abdominal distention, abdominal pain, constipation, diarrhea, nausea and vomiting. Endocrine: Negative for polydipsia, polyphagia and polyuria. Genitourinary: Negative for difficulty urinating and dysuria. Musculoskeletal: Negative for arthralgias, back pain and myalgias. Skin: Negative for rash. Allergic/Immunologic: Positive for environmental allergies. Neurological: Negative for dizziness, weakness, light-headedness and headaches. Hematological: Negative for adenopathy. Psychiatric/Behavioral: Negative for agitation and behavioral problems. The patient is not nervous/anxious. OBJECTIVE:      /88   Pulse 74   Temp 98.1 °F (36.7 °C)   Ht 5' 10\" (1.778 m)   Wt 224 lb (101.6 kg)   SpO2 98%   BMI 32.14 kg/m²     Physical Exam  Vitals reviewed. Constitutional:       General: He is not in acute distress. Appearance: Normal appearance. He is well-developed. HENT:      Head: Normocephalic and atraumatic. Right Ear: Hearing and external ear normal.      Left Ear: Hearing and external ear normal.      Nose: Nose normal. No congestion. Right Sinus: No maxillary sinus tenderness or frontal sinus tenderness. Left Sinus: No maxillary sinus tenderness or frontal sinus tenderness. Mouth/Throat:      Lips: Pink. No lesions. Mouth: Mucous membranes are moist. No oral lesions. Tongue: No lesions. Pharynx: Oropharynx is clear. No oropharyngeal exudate or posterior oropharyngeal erythema. Eyes:      Extraocular Movements: Extraocular movements intact. Conjunctiva/sclera: Conjunctivae normal.      Pupils: Pupils are equal, round, and reactive to light. Neck:      Thyroid: No thyroid mass. Cardiovascular:      Rate and Rhythm: Normal rate and regular rhythm. Pulses: Normal pulses. Heart sounds: Normal heart sounds. No murmur heard.      Pulmonary:      Effort: Pulmonary effort is sulfate HFA (VENTOLIN HFA) 108 (90 Base) MCG/ACT inhaler; Inhale 2 puffs into the lungs 4 times daily as needed for Wheezing  Dispense: 3 Inhaler; Refill: 1  - Full PFT Study With Bronchodilator; Future  - budesonide-formoterol (SYMBICORT) 160-4.5 MCG/ACT AERO; Inhale 2 puffs into the lungs 2 times daily  Dispense: 3 Inhaler; Refill: 1    2. Fatigue, unspecified type  - Will order above noted labs and discuss them with him at his follow up  - Vitamin D 25 Hydroxy; Future  - TSH with Reflex; Future  - Iron and TIBC; Future  - Vitamin B12 & Folate; Future    3. Preventative health care  - We did discuss in detail the and the recommended preventative screening guidelines (HTN, lipid, DM). - Will order above noted labs and call with results  - Will continue to follow annually and PRN   - CBC; Future  - Comprehensive Metabolic Panel; Future  - Hemoglobin A1C; Future  - Lipid Panel; Future    - Rest of systems unchanged, continue current treatments. - On this date July 15, 2021,  I have spent greater than 50% of this visit reviewing previous notes, test results and/or face to face with the patient discussing the diagnoses, importance of compliance with the treatment plan, counseling, coordinating care as well as documenting on the day of the visit.      BENNETT Arriaga-CNP

## 2021-07-25 ENCOUNTER — HOSPITAL ENCOUNTER (OUTPATIENT)
Dept: LAB | Age: 26
Setting detail: SPECIMEN
Discharge: HOME | End: 2021-07-25
Payer: COMMERCIAL

## 2021-07-25 DIAGNOSIS — Z01.818 PREOP TESTING: Primary | ICD-10-CM

## 2021-07-25 PROCEDURE — U0003 INFECTIOUS AGENT DETECTION BY NUCLEIC ACID (DNA OR RNA); SEVERE ACUTE RESPIRATORY SYNDROME CORONAVIRUS 2 (SARS-COV-2) (CORONAVIRUS DISEASE [COVID-19]), AMPLIFIED PROBE TECHNIQUE, MAKING USE OF HIGH THROUGHPUT TECHNOLOGIES AS DESCRIBED BY CMS-2020-01-R: HCPCS

## 2021-07-25 PROCEDURE — U0005 INFEC AGEN DETEC AMPLI PROBE: HCPCS

## 2021-07-26 ENCOUNTER — TELEPHONE (OUTPATIENT)
Dept: FAMILY MEDICINE CLINIC | Age: 26
End: 2021-07-26

## 2021-07-26 NOTE — TELEPHONE ENCOUNTER
Patient is not taking inhaler. Patient reporting cough was more of a hassle with taking Symbicort. After he stopped inhaler, the cough went away. Patient asking if he should be on different medication or if he is okay not taking inhaler. If patient is to continue Symbicort, is the cough a normal reaction that will go away?

## 2021-07-26 NOTE — TELEPHONE ENCOUNTER
Pt started taking the Symbicort for 5 days and had a dry cough. Stopped it and a few days later, the cough was gone. He wanted to make [facility] aware of this and wondered if he should be on something else to replace.

## 2021-07-26 NOTE — TELEPHONE ENCOUNTER
Can we call him and get some more information? He had the dry cough at our last visit. The symbicort was prescribed for his asthma exacerbation. I am confused as to what or why he would want something different.

## 2021-07-27 ENCOUNTER — TELEPHONE (OUTPATIENT)
Dept: FAMILY MEDICINE CLINIC | Age: 26
End: 2021-07-27

## 2021-07-27 LAB
SARS-COV-2: NORMAL
SARS-COV-2: NOT DETECTED
SOURCE: NORMAL

## 2021-07-27 NOTE — LETTER
Miguel Hillock, APRN-CNP  [address] [facility]  [address] [address], [address]  [facility] [address]  Dept: [phone]  Dept Fax: [phone]  July 27, 2021     Say Soto Michael  1995      To Whom it May Concern,     Say Holly was seen and treated in our office. Please excuse him from work on 7/26/2021. If you have any questions or concerns, please don't hesitate to call the office at the above number.     Sincerely,      DEVIN Bender

## 2021-07-27 NOTE — TELEPHONE ENCOUNTER
Patient called stating he needs excuse for work on yesterday b/c he to get a covid-19 test for his PFT On Thursday.

## 2021-07-29 ENCOUNTER — HOSPITAL ENCOUNTER (OUTPATIENT)
Dept: PULMONOLOGY | Age: 26
Discharge: HOME | End: 2021-07-29
Payer: COMMERCIAL

## 2021-07-29 DIAGNOSIS — J45.20 INTERMITTENT ASTHMA, UNSPECIFIED ASTHMA SEVERITY, UNSPECIFIED WHETHER COMPLICATED: ICD-10-CM

## 2021-07-29 PROCEDURE — 94640 AIRWAY INHALATION TREATMENT: CPT

## 2021-07-29 PROCEDURE — 94729 DIFFUSING CAPACITY: CPT

## 2021-07-29 PROCEDURE — 94060 EVALUATION OF WHEEZING: CPT

## 2021-07-29 PROCEDURE — 94726 PLETHYSMOGRAPHY LUNG VOLUMES: CPT

## 2021-07-29 PROCEDURE — 94664 DEMO&/EVAL PT USE INHALER: CPT

## 2021-08-03 ENCOUNTER — HOSPITAL ENCOUNTER (OUTPATIENT)
Age: 26
Discharge: HOME | End: 2021-08-03
Payer: COMMERCIAL

## 2021-08-03 DIAGNOSIS — Z00.00 PREVENTATIVE HEALTH CARE: ICD-10-CM

## 2021-08-03 DIAGNOSIS — R53.83 FATIGUE, UNSPECIFIED TYPE: ICD-10-CM

## 2021-08-03 LAB
ALBUMIN SERPL-MCNC: 4.8 G/DL (ref 3.5–5.2)
ALBUMIN/GLOBULIN RATIO: ABNORMAL (ref 1–2.5)
ALP BLD-CCNC: 78 U/L (ref 40–129)
ALT SERPL-CCNC: 30 U/L (ref 5–41)
ANION GAP SERPL CALCULATED.3IONS-SCNC: 7 MMOL/L (ref 9–17)
AST SERPL-CCNC: 21 U/L
BILIRUB SERPL-MCNC: 0.49 MG/DL (ref 0.3–1.2)
BUN BLDV-MCNC: 13 MG/DL (ref 6–20)
BUN/CREAT BLD: ABNORMAL (ref 9–20)
CALCIUM SERPL-MCNC: 9.6 MG/DL (ref 8.6–10.4)
CHLORIDE BLD-SCNC: 104 MMOL/L (ref 98–107)
CHOLESTEROL/HDL RATIO: 3.7
CHOLESTEROL: 140 MG/DL
CO2: 28 MMOL/L (ref 20–31)
CREAT SERPL-MCNC: 0.81 MG/DL (ref 0.7–1.2)
ESTIMATED AVERAGE GLUCOSE: 97 MG/DL
FOLATE: 15.6 NG/ML
GFR AFRICAN AMERICAN: >60 ML/MIN
GFR NON-AFRICAN AMERICAN: >60 ML/MIN
GFR SERPL CREATININE-BSD FRML MDRD: ABNORMAL ML/MIN/{1.73_M2}
GFR SERPL CREATININE-BSD FRML MDRD: ABNORMAL ML/MIN/{1.73_M2}
GLUCOSE BLD-MCNC: 100 MG/DL (ref 70–99)
HBA1C MFR BLD: 5 % (ref 4–6)
HCT VFR BLD CALC: 46.3 % (ref 41–53)
HDLC SERPL-MCNC: 38 MG/DL
HEMOGLOBIN: 16.2 G/DL (ref 13.5–17.5)
IRON SATURATION: 45 % (ref 20–55)
IRON: 116 UG/DL (ref 59–158)
LDL CHOLESTEROL: 84 MG/DL (ref 0–130)
MCH RBC QN AUTO: 29.2 PG (ref 26–34)
MCHC RBC AUTO-ENTMCNC: 35 G/DL (ref 31–37)
MCV RBC AUTO: 83.5 FL (ref 80–100)
NRBC AUTOMATED: NORMAL PER 100 WBC
PDW BLD-RTO: 13.2 % (ref 11.5–14.9)
PLATELET # BLD: 281 K/UL (ref 150–450)
PMV BLD AUTO: 7.3 FL (ref 6–12)
POTASSIUM SERPL-SCNC: 4.4 MMOL/L (ref 3.7–5.3)
RBC # BLD: 5.55 M/UL (ref 4.5–5.9)
SODIUM BLD-SCNC: 139 MMOL/L (ref 135–144)
TOTAL IRON BINDING CAPACITY: 260 UG/DL (ref 250–450)
TOTAL PROTEIN: 7.1 G/DL (ref 6.4–8.3)
TRIGL SERPL-MCNC: 92 MG/DL
TSH SERPL DL<=0.05 MIU/L-ACNC: 1.3 MIU/L (ref 0.3–5)
UNSATURATED IRON BINDING CAPACITY: 144 UG/DL (ref 112–347)
VITAMIN B-12: 799 PG/ML (ref 232–1245)
VITAMIN D 25-HYDROXY: 19.9 NG/ML (ref 30–100)
VLDLC SERPL CALC-MCNC: ABNORMAL MG/DL (ref 1–30)
WBC # BLD: 7.3 K/UL (ref 3.5–11)

## 2021-08-03 PROCEDURE — 80053 COMPREHEN METABOLIC PANEL: CPT

## 2021-08-03 PROCEDURE — 83540 ASSAY OF IRON: CPT

## 2021-08-03 PROCEDURE — 85027 COMPLETE CBC AUTOMATED: CPT

## 2021-08-03 PROCEDURE — 83036 HEMOGLOBIN GLYCOSYLATED A1C: CPT

## 2021-08-03 PROCEDURE — 84443 ASSAY THYROID STIM HORMONE: CPT

## 2021-08-03 PROCEDURE — 83550 IRON BINDING TEST: CPT

## 2021-08-03 PROCEDURE — 36415 COLL VENOUS BLD VENIPUNCTURE: CPT

## 2021-08-03 PROCEDURE — 82746 ASSAY OF FOLIC ACID SERUM: CPT

## 2021-08-03 PROCEDURE — 82607 VITAMIN B-12: CPT

## 2021-08-03 PROCEDURE — 80061 LIPID PANEL: CPT

## 2021-08-03 PROCEDURE — 82306 VITAMIN D 25 HYDROXY: CPT

## 2021-08-03 NOTE — PROCEDURES
[address] [facility] [address]                               PULMONARY FUNCTION    PATIENT NAME: Lars Burks                :        1995  MED REC NO:   [id number]                             ROOM:  ACCOUNT NO:   [de-identified]                           ADMIT DATE: 2021  PROVIDER:     Marylu Mcgowan    DATE OF PROCEDURE:  2021    Spirometry is normal.  FEV1 106% predicted with 7% bronchodilator change  to 114% predicted. % predicted with 2% bronchodilator change to  111% predicted. FEV1 to FVC ratio post bronchodilator 86. Total lung capacity by body box 114% predicted, % predicted. Diffusion capacity corrected for alveolar volume, 142% predicted. Airway resistance normal.    FINAL IMPRESSION:  Normal PFT.   Clinical correlation advised        JOHN JOSE    D: 2021 13:53:54       T: 2021 14:00:34     /[email or web address]  Job#: [id number]     Doc#: [id number]    CC:

## 2021-08-05 ENCOUNTER — OFFICE VISIT (OUTPATIENT)
Dept: FAMILY MEDICINE CLINIC | Age: 26
End: 2021-08-05
Payer: MEDICARE

## 2021-08-05 VITALS
OXYGEN SATURATION: 98 % | BODY MASS INDEX: 32.71 KG/M2 | DIASTOLIC BLOOD PRESSURE: 82 MMHG | TEMPERATURE: 98.4 F | SYSTOLIC BLOOD PRESSURE: 130 MMHG | HEART RATE: 70 BPM | WEIGHT: 228 LBS

## 2021-08-05 DIAGNOSIS — E55.9 VITAMIN D DEFICIENCY: ICD-10-CM

## 2021-08-05 DIAGNOSIS — R03.0 BLOOD PRESSURE ELEVATED WITHOUT HISTORY OF HTN: Primary | ICD-10-CM

## 2021-08-05 DIAGNOSIS — J45.20 INTERMITTENT ASTHMA, UNSPECIFIED ASTHMA SEVERITY, UNSPECIFIED WHETHER COMPLICATED: ICD-10-CM

## 2021-08-05 PROCEDURE — G8417 CALC BMI ABV UP PARAM F/U: HCPCS | Performed by: NURSE PRACTITIONER

## 2021-08-05 PROCEDURE — 1036F TOBACCO NON-USER: CPT | Performed by: NURSE PRACTITIONER

## 2021-08-05 PROCEDURE — G8427 DOCREV CUR MEDS BY ELIG CLIN: HCPCS | Performed by: NURSE PRACTITIONER

## 2021-08-05 PROCEDURE — 99213 OFFICE O/P EST LOW 20 MIN: CPT | Performed by: NURSE PRACTITIONER

## 2021-08-05 RX ORDER — FLUTICASONE PROPIONATE 44 UG/1
2 AEROSOL, METERED RESPIRATORY (INHALATION) 2 TIMES DAILY
Qty: 1 INHALER | Refills: 3 | Status: SHIPPED | OUTPATIENT
Start: 2021-08-05 | End: 2021-12-08

## 2021-08-05 RX ORDER — ERGOCALCIFEROL 1.25 MG/1
50000 CAPSULE ORAL WEEKLY
Qty: 24 CAPSULE | Refills: 0 | Status: SHIPPED | OUTPATIENT
Start: 2021-08-05 | End: 2021-12-14 | Stop reason: ALTCHOICE

## 2021-08-05 ASSESSMENT — ENCOUNTER SYMPTOMS
ABDOMINAL PAIN: 0
ABDOMINAL DISTENTION: 0
CONSTIPATION: 0
SORE THROAT: 0
VOMITING: 0
BACK PAIN: 0
COUGH: 1
CHEST TIGHTNESS: 0
SHORTNESS OF BREATH: 1
RHINORRHEA: 0
NAUSEA: 0
DIARRHEA: 0

## 2021-08-05 NOTE — PROGRESS NOTES
Katie Carlos, APRN-CNP  [address] [facility]  [address] [address]  [address] [address]  Dept: [phone]  Dept Fax: [phone]     PATIENT ID: Vonnie Carrera is a 31 y.o. male. HPI:  Established pt here today for f/u on chronic medical problems; elevated BP, asthma, go over labs and/or diagnostic studies, and medication refills. Pt denies any fever or chills. Pt today denies any HA, chest pain, or SOB. Pt denies any N/V/D/C or abdominal pain. Today, patient is doing well on current tx and voices no concerns. My previous office notes, labs and diagnostic studies were reviewed prior to and during encounter. The patient's past medical, surgical, social, and family history as well as current medications and allergies were reviewed as documented in today's encounter by KANDY Davila. Current Outpatient Medications on File Prior to Visit   Medication Sig Dispense Refill    albuterol sulfate HFA (VENTOLIN HFA) 108 (90 Base) MCG/ACT inhaler Inhale 2 puffs into the lungs 4 times daily as needed for Wheezing 3 Inhaler 1    ibuprofen (ADVIL;MOTRIN) 800 MG tablet Take 600 mg by mouth every 6 hours as needed for Pain        No current facility-administered medications on file prior to visit. SUBJECTIVE:     Review of Systems   Constitutional: Negative for activity change, fatigue and fever. HENT: Negative for congestion, ear pain, rhinorrhea and sore throat. Respiratory: Positive for cough (intermittent) and shortness of breath (ongoing; intermittent). Negative for chest tightness. Cardiovascular: Negative for chest pain and palpitations. Gastrointestinal: Negative for abdominal distention, abdominal pain, constipation, diarrhea, nausea and vomiting. Endocrine: Negative for polydipsia, polyphagia and polyuria. Genitourinary: Negative for difficulty urinating and dysuria.    Musculoskeletal: Negative for arthralgias, back pain and myalgias. Skin: Negative for rash. Neurological: Negative for dizziness, weakness, light-headedness and headaches. Hematological: Negative for adenopathy. Psychiatric/Behavioral: Negative for agitation and behavioral problems. The patient is not nervous/anxious. OBJECTIVE:  /82   Pulse 70   Temp 98.4 °F (36.9 °C)   Wt 228 lb (103.4 kg)   SpO2 98%   BMI 32.71 kg/m²     Physical Exam  Vitals and nursing note reviewed. HENT:      Head: Normocephalic and atraumatic. Cardiovascular:      Rate and Rhythm: Normal rate and regular rhythm. Heart sounds: Normal heart sounds. No murmur heard. Pulmonary:      Effort: Pulmonary effort is normal. No respiratory distress. Breath sounds: Normal breath sounds. Chest:      Chest wall: No tenderness. Abdominal:      General: Bowel sounds are normal.      Palpations: Abdomen is soft. Tenderness: There is no abdominal tenderness. Musculoskeletal:         General: Normal range of motion. Cervical back: Normal range of motion. Skin:     General: Skin is warm and dry. Findings: No rash. Neurological:      Mental Status: He is alert and oriented to person, place, and time. ASSESSMENT:   Diagnosis Orders   1. Blood pressure elevated without history of HTN     2. Intermittent asthma, unspecified asthma severity, unspecified whether complicated     3. Vitamin D deficiency       PLAN:  1. Blood pressure elevated without history of HTN  - BP improved this visit  - 130/82  - Will continue to monitor    2. Intermittent asthma, unspecified asthma severity, unspecified whether complicated  - PFT noted   - Normal study  - Tried Symbicort but made him cough more so he stopped it  - Will try Flovent    3.  Vitamin D deficiency  - Vitamin D noted (19.9) and is low  - I have sent a twice weekly supplement for 12 weeks of 50,000 IU of vitamin D  - Please increase foods with Vitamin D, which include cheese, eggs, cereals, yogurt, milk, tofu, any type of beef, salmon or tuna,  spinach, and mushroom. - Will re-check after completion       - Rest of systems unchanged, continue current treatments. - On this date August 5, 2021,  I have spent greater than 50% of this visit reviewing previous notes, test results and/or face to face with the patient discussing the diagnoses, importance of compliance with the treatment plan, counseling, coordinating care as well as documenting on the day of the visit.      Ruben Vides, APRN-CNP

## 2021-08-12 ENCOUNTER — PATIENT MESSAGE (OUTPATIENT)
Dept: FAMILY MEDICINE CLINIC | Age: 26
End: 2021-08-12

## 2021-08-12 DIAGNOSIS — J45.20 INTERMITTENT ASTHMA, UNSPECIFIED ASTHMA SEVERITY, UNSPECIFIED WHETHER COMPLICATED: Primary | ICD-10-CM

## 2021-08-12 NOTE — TELEPHONE ENCOUNTER
From: Olga Cuevas  To: Coleen Vides, APRN - NP  Sent: 8/12/2021 9:51 AM EDT  Subject: Non-Urgent Medical Question    Hi. I was just wondering if my asthma would make me eligible for fmla and if so how would i go about starting the paperwork.

## 2021-12-08 RX ORDER — FLUTICASONE PROPIONATE 44 MCG
AEROSOL WITH ADAPTER (GRAM) INHALATION
Qty: 10.6 G | Refills: 3 | Status: SHIPPED | OUTPATIENT
Start: 2021-12-08

## 2021-12-14 ENCOUNTER — OFFICE VISIT (OUTPATIENT)
Dept: FAMILY MEDICINE CLINIC | Age: 26
End: 2021-12-14
Payer: COMMERCIAL

## 2021-12-14 VITALS
TEMPERATURE: 98.6 F | DIASTOLIC BLOOD PRESSURE: 82 MMHG | SYSTOLIC BLOOD PRESSURE: 120 MMHG | BODY MASS INDEX: 32.57 KG/M2 | HEART RATE: 74 BPM | OXYGEN SATURATION: 97 % | WEIGHT: 227 LBS

## 2021-12-14 DIAGNOSIS — N50.812 PAIN IN BOTH TESTICLES: ICD-10-CM

## 2021-12-14 DIAGNOSIS — M54.32 BILATERAL SCIATICA: Primary | ICD-10-CM

## 2021-12-14 DIAGNOSIS — N50.811 PAIN IN BOTH TESTICLES: ICD-10-CM

## 2021-12-14 DIAGNOSIS — M54.31 BILATERAL SCIATICA: Primary | ICD-10-CM

## 2021-12-14 PROCEDURE — 1036F TOBACCO NON-USER: CPT | Performed by: NURSE PRACTITIONER

## 2021-12-14 PROCEDURE — G8427 DOCREV CUR MEDS BY ELIG CLIN: HCPCS | Performed by: NURSE PRACTITIONER

## 2021-12-14 PROCEDURE — G8484 FLU IMMUNIZE NO ADMIN: HCPCS | Performed by: NURSE PRACTITIONER

## 2021-12-14 PROCEDURE — G8417 CALC BMI ABV UP PARAM F/U: HCPCS | Performed by: NURSE PRACTITIONER

## 2021-12-14 PROCEDURE — 99213 OFFICE O/P EST LOW 20 MIN: CPT | Performed by: NURSE PRACTITIONER

## 2021-12-14 RX ORDER — PREDNISONE 20 MG/1
TABLET ORAL
Qty: 17 TABLET | Refills: 0 | Status: SHIPPED | OUTPATIENT
Start: 2021-12-14

## 2021-12-14 RX ORDER — CYCLOBENZAPRINE HCL 10 MG
10 TABLET ORAL NIGHTLY PRN
Qty: 10 TABLET | Refills: 0 | Status: SHIPPED | OUTPATIENT
Start: 2021-12-14 | End: 2021-12-24

## 2021-12-14 RX ORDER — IBUPROFEN 800 MG/1
800 TABLET ORAL EVERY 8 HOURS PRN
Qty: 30 TABLET | Refills: 0 | Status: SHIPPED | OUTPATIENT
Start: 2021-12-14 | End: 2021-12-24

## 2021-12-14 ASSESSMENT — ENCOUNTER SYMPTOMS
VOMITING: 0
DIARRHEA: 0
SORE THROAT: 0
CHEST TIGHTNESS: 0
ABDOMINAL DISTENTION: 0
BACK PAIN: 1
CONSTIPATION: 0
ABDOMINAL PAIN: 0
RHINORRHEA: 0
COUGH: 0
NAUSEA: 0
SHORTNESS OF BREATH: 0

## 2021-12-14 NOTE — PROGRESS NOTES
Brandon Hoang, APRN-[address]  [id number]  [address] [address]  Dept: [phone]  Dept Fax: [phone]     PATIENT ID: Todd Jain is a 31 y.o. male. HPI:  Established pt here today for an acute visit secondary to low back pain. He relates that he was recently moving. He was carrying boxes up and down stairs multiple times. He relates that the day after moving, he felt okay but as a couple of days went by his pain has worsened. He relates that he is now having shooting pain radiating down both legs, right worse than left. The pain was also shooting into his testicles. He does have a history of a hydrocele. He did follow with Dr. Michael Velazco back in 2015 and no intervention was recommended. He relates that he has intermittent testicular pain, maybe monthly, that comes and goes. Today, he relates that his testicular pain has resolved. Pt denies any fever or chills. Pt today denies any HA, chest pain, or SOB. Pt denies any N/V/D/C or abdominal pain. He denies any loss of bowel or bladder associated with the back pain    My previous office notes, labs and diagnostic studies were reviewed prior to and during encounter. The patient's past medical, surgical, social, and family history as well as current medications and allergies were reviewed as documented in today's encounter by KANDY Paul. Current Outpatient Medications on File Prior to Visit   Medication Sig Dispense Refill    FLOVENT HFA 44 MCG/ACT inhaler inhale 2 puffs INTO THE LUNGS twice a day 10.6 g 3    albuterol sulfate HFA (VENTOLIN HFA) 108 (90 Base) MCG/ACT inhaler Inhale 2 puffs into the lungs 4 times daily as needed for Wheezing 3 Inhaler 1    ibuprofen (ADVIL;MOTRIN) 800 MG tablet Take 600 mg by mouth every 6 hours as needed for Pain        No current facility-administered medications on file prior to visit.      SUBJECTIVE:     Review of Systems   Constitutional: Negative for activity change, fatigue and fever. HENT: Negative for congestion, ear pain, rhinorrhea and sore throat. Respiratory: Negative for cough, chest tightness and shortness of breath. Cardiovascular: Negative for chest pain and palpitations. Gastrointestinal: Negative for abdominal distention, abdominal pain, constipation, diarrhea, nausea and vomiting. Endocrine: Negative for polydipsia, polyphagia and polyuria. Genitourinary: Negative for difficulty urinating and dysuria. Musculoskeletal: Positive for back pain (with radiation down both legs; right worse than left). Negative for arthralgias and myalgias. Skin: Negative for rash. Neurological: Negative for dizziness, weakness, light-headedness and headaches. Hematological: Negative for adenopathy. Psychiatric/Behavioral: Negative for agitation and behavioral problems. The patient is not nervous/anxious. OBJECTIVE:      /82   Pulse 74   Temp 98.6 °F (37 °C)   Wt 227 lb (103 kg)   SpO2 97%   BMI 32.57 kg/m²     Physical Exam  Vitals reviewed. Constitutional:       General: He is not in acute distress. HENT:      Head: Normocephalic and atraumatic. Pulmonary:      Effort: Pulmonary effort is normal. No respiratory distress. Musculoskeletal:      Comments: Back exam:   Inspection: No visible abnormalities noted. Palpation: Diffuse lumbar spine point tenderness, bilateral lumbar paraspinal muscle tenderness. ROM: Flexion, extension, bending and twisting slightly decreased. Reproducible pain with ROM. Straight leg test: Negative. Strength: 5/5 bilateral lower extremities. DTR (patellar): 2/4 and symmetric. Gait: Normal.    Neurological:      General: No focal deficit present. Mental Status: He is alert and oriented to person, place, and time. Psychiatric:         Mood and Affect: Mood normal.       ASSESSMENT:   Diagnosis Orders   1.  Bilateral sciatica  predniSONE (DELTASONE) 20 MG tablet    cyclobenzaprine (FLEXERIL) 10 MG tablet    ibuprofen (ADVIL;MOTRIN) 800 MG tablet   2. Pain in both testicles       PLAN:  1. Bilateral sciatica  - Steroid taper  - Ibuprofen 800 mg TID  - Flexeril 10 mg HS  - Take prednisone w/food in the AM  - Low back stretching was discussed and provided on patients after visit summary.   - Moist heat locally. - Consider X-ray studies and formal physical therapy if not improving.   - Advised emergent treatment for any sudden loss of bowel or bladder associated with lower back pain, patient verbalized understanding.  - Call or return to clinic prn if these symptoms worsen or fail to improve as anticipated. - predniSONE (DELTASONE) 20 MG tablet; Take 2 tabs daily x 4 days, then 1 tab daily x 6 days, then 1/2 tab daily x 6 days  Dispense: 17 tablet; Refill: 0  - cyclobenzaprine (FLEXERIL) 10 MG tablet; Take 1 tablet by mouth nightly as needed for Muscle spasms  Dispense: 10 tablet; Refill: 0  - ibuprofen (ADVIL;MOTRIN) 800 MG tablet; Take 1 tablet by mouth every 8 hours as needed for Pain  Dispense: 30 tablet; Refill: 0    2. Pain in both testicles  - US reviewed from 2015 as well as Dr. Kendrick Concepcion recommendation.  - He will call the office for follow up as he is hoping to also have a vasectomy.     - Rest of systems unchanged, continue current treatments. - On this date December 14, 2021,  I have spent greater than 50% of this visit reviewing previous notes, test results and/or face to face with the patient discussing the diagnoses, importance of compliance with the treatment plan, counseling, coordinating care as well as documenting on the day of the visit.      Alice Mann, BENNETT-CNP

## 2022-03-25 ENCOUNTER — HOSPITAL ENCOUNTER (OUTPATIENT)
Age: 27
Discharge: HOME | End: 2022-03-27
Payer: COMMERCIAL

## 2022-03-25 ENCOUNTER — HOSPITAL ENCOUNTER (OUTPATIENT)
Dept: GENERAL RADIOLOGY | Age: 27
Discharge: HOME | End: 2022-03-27
Payer: COMMERCIAL

## 2022-03-25 DIAGNOSIS — R06.02 SOB (SHORTNESS OF BREATH): ICD-10-CM

## 2022-03-25 PROCEDURE — 71046 X-RAY EXAM CHEST 2 VIEWS: CPT

## 2022-06-18 NOTE — PATIENT INSTRUCTIONS
Patient Education        Sciatica: Exercises  Introduction  Here are some examples of typical rehabilitation exercises for your condition. Start each exercise slowly. Ease off the exercise if you start to have pain. Your doctor or physical therapist will tell you when you can start these exercises and which ones will work best for you. When you are not being active, find a comfortable position for rest. Some people are comfortable on the floor or a medium-firm bed with a small pillow under their head and another under their knees. Some people prefer to lie on their side with a pillow between their knees. Don't stay in one position for too long. Take short walks (10 to 20 minutes) every 2 to 3 hours. Avoid slopes, hills, and stairs until you feel better. Walk only distances you can manage without pain, especially leg pain. How to do the exercises  Back stretches    1. Get down on your hands and knees on the floor. 2. Relax your head and allow it to droop. Round your back up toward the ceiling until you feel a nice stretch in your upper, middle, and lower back. Hold this stretch for as long as it feels comfortable, or about 15 to 30 seconds. 3. Return to the starting position with a flat back while you are on your hands and knees. 4. Let your back sway by pressing your stomach toward the floor. Lift your buttocks toward the ceiling. 5. Hold this position for 15 to 30 seconds. 6. Repeat 2 to 4 times. Follow-up care is a key part of your treatment and safety. Be sure to make and go to all appointments, and call your doctor if you are having problems. It's also a good idea to know your test results and keep a list of the medicines you take. Where can you learn more? Go to [email or web address]. org and sign in to your [facility] [facility] account. Enter D010 in the [employer] box to learn more about \"Sciatica: Exercises. \"     If you do not have an account, please click on the \"Sign Up Now\" link.  Current as of: July 1, 2021               Content Version: 13.0  © 5368-8327 [employer]. Care instructions adapted under license by [facility] ([facility]). If you have questions about a medical condition or this instruction, always ask your healthcare professional. [place] [address] any warranty or liability for your use of this information.          Dr Bobby Serrano The patient is an 87 year old male with a history of HTN, HL, BPH, CAD s/p CABG, mild chronic systolic heart failure who is admitted with a hip fracture.    Plan:  - ECG with mild conduction abnormalities but no evidence of ischemia or infarction  - Continue atorvastatin 20 mg daily  - As per patient's outpatient cardiologist Dr. De La Rosa, the patient had a cardiac catheterization 7/20/21 with patent LIMA-LAD, patient SVG-RCA, patient SVG-D and an occluded SVG-OM. EF was 45%.  - Reportedly there was an episode of syncope later in evening after fall - will check echo and monitor on telemetry  - Additionally, there was an episode of coffee ground emesis yesterday in hospital - GI re-evaluation. Hemoglobin trending down. If trends down further may need to be transfused.  - Aspirin on hold - resume when ok from a GI standpoint  - BP low  - Increase LR to 100 cc/hr  - Hold metoprolol and losartan due to low BPs  - From a cardiac standpoint, patient may proceed for surgery if echo unremarkable, hemoglobin stabilizes with GI optimization, and BP improves The patient is an 87 year old male with a history of HTN, HL, BPH, CAD s/p CABG, mild chronic systolic heart failure who is admitted with a hip fracture.    Plan:  - ECG with mild conduction abnormalities but no evidence of ischemia or infarction  - Continue atorvastatin 20 mg daily  - As per patient's outpatient cardiologist Dr. De La Rosa, the patient had a cardiac catheterization 7/20/21 with patent LIMA-LAD, patient SVG-RCA, patient SVG-D and an occluded SVG-OM. EF was 45%.  - Reportedly there was an episode of syncope later in evening after fall - will check echo and monitor on telemetry  - Additionally, there was an episode of coffee ground emesis yesterday in hospital - GI re-evaluation. Hemoglobin trending down. If trends down further may need to be transfused.  - Aspirin on hold - resume when ok from a GI standpoint  - BP low  - Increase LR to 100 cc/hr  - Hold metoprolol and losartan due to low BPs  - From a cardiac standpoint, patient may proceed for surgery if echo unremarkable, hemoglobin stabilizes with GI optimization, and BP improves    ADDENDUM:  - Echo images reviewed - technically difficult, LV systolic function is grossly normal, mild aortic stenosis, no pulm HTN (PASP 38 mmHg)

## 2022-09-22 ENCOUNTER — HOSPITAL ENCOUNTER (OUTPATIENT)
Age: 27
Discharge: HOME | End: 2022-09-22

## 2023-03-21 DIAGNOSIS — J45.20 INTERMITTENT ASTHMA, UNSPECIFIED ASTHMA SEVERITY, UNSPECIFIED WHETHER COMPLICATED: ICD-10-CM

## 2023-03-21 RX ORDER — ALBUTEROL SULFATE 90 UG/1
2 AEROSOL, METERED RESPIRATORY (INHALATION) 4 TIMES DAILY PRN
Qty: 1 EACH | Refills: 0 | Status: SHIPPED | OUTPATIENT
Start: 2023-03-21

## 2023-03-30 ENCOUNTER — OFFICE VISIT (OUTPATIENT)
Dept: FAMILY MEDICINE CLINIC | Age: 28
End: 2023-03-30
Payer: COMMERCIAL

## 2023-03-30 VITALS
HEIGHT: 70 IN | BODY MASS INDEX: 32.93 KG/M2 | HEART RATE: 87 BPM | SYSTOLIC BLOOD PRESSURE: 130 MMHG | OXYGEN SATURATION: 97 % | TEMPERATURE: 97.7 F | DIASTOLIC BLOOD PRESSURE: 84 MMHG | WEIGHT: 230 LBS

## 2023-03-30 DIAGNOSIS — J45.20 INTERMITTENT ASTHMA, UNSPECIFIED ASTHMA SEVERITY, UNSPECIFIED WHETHER COMPLICATED: ICD-10-CM

## 2023-03-30 DIAGNOSIS — J40 BRONCHITIS: ICD-10-CM

## 2023-03-30 DIAGNOSIS — Z00.00 PREVENTATIVE HEALTH CARE: ICD-10-CM

## 2023-03-30 DIAGNOSIS — E55.9 VITAMIN D DEFICIENCY: ICD-10-CM

## 2023-03-30 DIAGNOSIS — Z00.00 ANNUAL PHYSICAL EXAM: Primary | ICD-10-CM

## 2023-03-30 PROCEDURE — 99395 PREV VISIT EST AGE 18-39: CPT | Performed by: NURSE PRACTITIONER

## 2023-03-30 RX ORDER — PREDNISONE 20 MG/1
40 TABLET ORAL DAILY
Qty: 5 TABLET | Refills: 0 | Status: SHIPPED | OUTPATIENT
Start: 2023-03-30 | End: 2023-04-04

## 2023-03-30 RX ORDER — AMOXICILLIN 875 MG/1
875 TABLET, COATED ORAL 2 TIMES DAILY
Qty: 20 TABLET | Refills: 0 | Status: SHIPPED | OUTPATIENT
Start: 2023-03-30 | End: 2023-04-09

## 2023-03-30 RX ORDER — FLUTICASONE PROPIONATE AND SALMETEROL 100; 50 UG/1; UG/1
1 POWDER RESPIRATORY (INHALATION) EVERY 12 HOURS
Qty: 3 EACH | Refills: 3 | Status: SHIPPED | OUTPATIENT
Start: 2023-03-30 | End: 2023-06-28

## 2023-03-30 SDOH — ECONOMIC STABILITY: FOOD INSECURITY: WITHIN THE PAST 12 MONTHS, THE FOOD YOU BOUGHT JUST DIDN'T LAST AND YOU DIDN'T HAVE MONEY TO GET MORE.: NEVER TRUE

## 2023-03-30 SDOH — ECONOMIC STABILITY: HOUSING INSECURITY
IN THE LAST 12 MONTHS, WAS THERE A TIME WHEN YOU DID NOT HAVE A STEADY PLACE TO SLEEP OR SLEPT IN A SHELTER (INCLUDING NOW)?: NO

## 2023-03-30 SDOH — ECONOMIC STABILITY: INCOME INSECURITY: HOW HARD IS IT FOR YOU TO PAY FOR THE VERY BASICS LIKE FOOD, HOUSING, MEDICAL CARE, AND HEATING?: NOT HARD AT ALL

## 2023-03-30 SDOH — ECONOMIC STABILITY: FOOD INSECURITY: WITHIN THE PAST 12 MONTHS, YOU WORRIED THAT YOUR FOOD WOULD RUN OUT BEFORE YOU GOT MONEY TO BUY MORE.: NEVER TRUE

## 2023-03-30 ASSESSMENT — ENCOUNTER SYMPTOMS
COUGH: 1
VOMITING: 0
CHEST TIGHTNESS: 0
SHORTNESS OF BREATH: 1
CONSTIPATION: 0
NAUSEA: 0
BACK PAIN: 0
DIARRHEA: 0
ABDOMINAL DISTENTION: 0
ABDOMINAL PAIN: 0
RHINORRHEA: 0
SORE THROAT: 1

## 2023-03-30 ASSESSMENT — PATIENT HEALTH QUESTIONNAIRE - PHQ9
SUM OF ALL RESPONSES TO PHQ QUESTIONS 1-9: 0
1. LITTLE INTEREST OR PLEASURE IN DOING THINGS: 0
SUM OF ALL RESPONSES TO PHQ QUESTIONS 1-9: 0
2. FEELING DOWN, DEPRESSED OR HOPELESS: 0
SUM OF ALL RESPONSES TO PHQ QUESTIONS 1-9: 0
SUM OF ALL RESPONSES TO PHQ QUESTIONS 1-9: 0
SUM OF ALL RESPONSES TO PHQ9 QUESTIONS 1 & 2: 0

## 2023-03-30 NOTE — PROGRESS NOTES
for sore throat. Negative for congestion, ear pain and rhinorrhea. Respiratory:  Positive for cough and shortness of breath. Negative for chest tightness. Cardiovascular:  Negative for chest pain and palpitations. Gastrointestinal:  Negative for abdominal distention, abdominal pain, constipation, diarrhea, nausea and vomiting. Endocrine: Negative for polydipsia, polyphagia and polyuria. Genitourinary:  Negative for difficulty urinating and dysuria. Musculoskeletal:  Negative for arthralgias, back pain and myalgias. Skin:  Negative for rash. Neurological:  Negative for dizziness, weakness, light-headedness and headaches. Hematological:  Negative for adenopathy. Psychiatric/Behavioral:  Negative for agitation and behavioral problems. The patient is not nervous/anxious. OBJECTIVE:  /84   Pulse 87   Temp 97.7 °F (36.5 °C)   Ht 5' 10\" (1.778 m)   Wt 230 lb (104.3 kg)   SpO2 97%   BMI 33.00 kg/m²     Physical Exam  Vitals and nursing note reviewed. Constitutional:       General: He is not in acute distress. Appearance: Normal appearance. He is well-developed. HENT:      Head: Normocephalic and atraumatic. Cardiovascular:      Rate and Rhythm: Normal rate and regular rhythm. Heart sounds: Normal heart sounds. No murmur heard. Pulmonary:      Effort: Pulmonary effort is normal. No respiratory distress. Breath sounds: Normal breath sounds. Decreased air movement present. Chest:      Chest wall: No tenderness. Abdominal:      General: Bowel sounds are normal.      Palpations: Abdomen is soft. Tenderness: There is no abdominal tenderness. Musculoskeletal:         General: Normal range of motion. Cervical back: Normal range of motion. Right lower leg: No edema. Left lower leg: No edema. Skin:     General: Skin is warm and dry. Findings: No rash. Neurological:      Mental Status: He is alert and oriented to person, place, and time.

## 2023-03-31 ENCOUNTER — TELEPHONE (OUTPATIENT)
Dept: FAMILY MEDICINE CLINIC | Age: 28
End: 2023-03-31

## 2023-03-31 RX ORDER — PREDNISONE 20 MG/1
40 TABLET ORAL DAILY
Qty: 10 TABLET | Refills: 0 | Status: SHIPPED | OUTPATIENT
Start: 2023-03-31 | End: 2023-04-05

## 2023-03-31 NOTE — TELEPHONE ENCOUNTER
Pt received rx for Prednisone 20mg to take BID for 5days. Pt only received 5tablets. He is asking what should he do.

## 2023-04-24 ENCOUNTER — HOSPITAL ENCOUNTER (OUTPATIENT)
Age: 28
Discharge: HOME | End: 2023-04-24
Payer: MEDICAID

## 2023-04-24 DIAGNOSIS — Z00.00 PREVENTATIVE HEALTH CARE: ICD-10-CM

## 2023-04-24 DIAGNOSIS — E55.9 VITAMIN D DEFICIENCY: ICD-10-CM

## 2023-04-24 LAB
ALBUMIN SERPL-MCNC: 4.8 G/DL (ref 3.5–5.2)
ALP SERPL-CCNC: 71 U/L (ref 40–129)
ALT SERPL-CCNC: 36 U/L (ref 5–41)
ANION GAP SERPL CALCULATED.3IONS-SCNC: 9 MMOL/L (ref 9–17)
AST SERPL-CCNC: 20 U/L
BILIRUB SERPL-MCNC: 0.3 MG/DL (ref 0.3–1.2)
BUN SERPL-MCNC: 13 MG/DL (ref 6–20)
CALCIUM SERPL-MCNC: 9.8 MG/DL (ref 8.6–10.4)
CHLORIDE SERPL-SCNC: 101 MMOL/L (ref 98–107)
CHOLEST SERPL-MCNC: 161 MG/DL
CHOLESTEROL/HDL RATIO: 3.9
CO2 SERPL-SCNC: 29 MMOL/L (ref 20–31)
CREAT SERPL-MCNC: 0.83 MG/DL (ref 0.7–1.2)
GFR SERPL CREATININE-BSD FRML MDRD: >60 ML/MIN/1.73M2
GLUCOSE SERPL-MCNC: 121 MG/DL (ref 70–99)
HCT VFR BLD AUTO: 46.2 % (ref 41–53)
HDLC SERPL-MCNC: 41 MG/DL
HGB BLD-MCNC: 16 G/DL (ref 13.5–17.5)
LDLC SERPL CALC-MCNC: 87 MG/DL (ref 0–130)
MCH RBC QN AUTO: 28.7 PG (ref 26–34)
MCHC RBC AUTO-ENTMCNC: 34.5 G/DL (ref 31–37)
MCV RBC AUTO: 83 FL (ref 80–100)
PDW BLD-RTO: 13.7 % (ref 11.5–14.9)
PLATELET # BLD AUTO: 249 K/UL (ref 150–450)
PMV BLD AUTO: 7.5 FL (ref 6–12)
POTASSIUM SERPL-SCNC: 4.3 MMOL/L (ref 3.7–5.3)
PROT SERPL-MCNC: 7 G/DL (ref 6.4–8.3)
RBC # BLD: 5.57 M/UL (ref 4.5–5.9)
SODIUM SERPL-SCNC: 139 MMOL/L (ref 135–144)
TRIGL SERPL-MCNC: 166 MG/DL
WBC # BLD AUTO: 6.6 K/UL (ref 3.5–11)

## 2023-04-24 PROCEDURE — 36415 COLL VENOUS BLD VENIPUNCTURE: CPT

## 2023-04-24 PROCEDURE — 85027 COMPLETE CBC AUTOMATED: CPT

## 2023-04-24 PROCEDURE — 80053 COMPREHEN METABOLIC PANEL: CPT

## 2023-04-24 PROCEDURE — 80061 LIPID PANEL: CPT

## 2023-04-24 PROCEDURE — 82306 VITAMIN D 25 HYDROXY: CPT

## 2023-04-24 PROCEDURE — 83036 HEMOGLOBIN GLYCOSYLATED A1C: CPT

## 2023-04-25 LAB — 25(OH)D3 SERPL-MCNC: 34.2 NG/ML

## 2023-04-26 LAB
EST. AVERAGE GLUCOSE BLD GHB EST-MCNC: 94 MG/DL
HBA1C MFR BLD: 4.9 % (ref 4–6)

## 2024-01-17 ENCOUNTER — OFFICE VISIT (OUTPATIENT)
Dept: FAMILY MEDICINE CLINIC | Age: 29
End: 2024-01-17
Payer: COMMERCIAL

## 2024-01-17 VITALS
BODY MASS INDEX: 34.22 KG/M2 | HEART RATE: 70 BPM | SYSTOLIC BLOOD PRESSURE: 122 MMHG | HEIGHT: 70 IN | OXYGEN SATURATION: 99 % | WEIGHT: 239 LBS | TEMPERATURE: 97 F | DIASTOLIC BLOOD PRESSURE: 84 MMHG

## 2024-01-17 DIAGNOSIS — J40 BRONCHITIS: Primary | ICD-10-CM

## 2024-01-17 PROCEDURE — 99213 OFFICE O/P EST LOW 20 MIN: CPT | Performed by: NURSE PRACTITIONER

## 2024-01-17 RX ORDER — PREDNISONE 20 MG/1
20 TABLET ORAL DAILY
Qty: 5 TABLET | Refills: 0 | Status: SHIPPED | OUTPATIENT
Start: 2024-01-17 | End: 2024-01-22

## 2024-01-17 RX ORDER — FLUTICASONE PROPIONATE AND SALMETEROL 100; 50 UG/1; UG/1
1 POWDER RESPIRATORY (INHALATION) EVERY 12 HOURS
Qty: 3 EACH | Refills: 3 | Status: SHIPPED | OUTPATIENT
Start: 2024-01-17 | End: 2024-01-19 | Stop reason: SDUPTHER

## 2024-01-17 RX ORDER — ALBUTEROL SULFATE 90 UG/1
2 AEROSOL, METERED RESPIRATORY (INHALATION) 4 TIMES DAILY PRN
Qty: 1 EACH | Refills: 0 | Status: SHIPPED | OUTPATIENT
Start: 2024-01-17

## 2024-01-17 RX ORDER — AMOXICILLIN AND CLAVULANATE POTASSIUM 875; 125 MG/1; MG/1
1 TABLET, FILM COATED ORAL 2 TIMES DAILY
Qty: 20 TABLET | Refills: 0 | Status: SHIPPED | OUTPATIENT
Start: 2024-01-17 | End: 2024-01-27

## 2024-01-17 ASSESSMENT — PATIENT HEALTH QUESTIONNAIRE - PHQ9
SUM OF ALL RESPONSES TO PHQ9 QUESTIONS 1 & 2: 0
SUM OF ALL RESPONSES TO PHQ QUESTIONS 1-9: 0
2. FEELING DOWN, DEPRESSED OR HOPELESS: 0
1. LITTLE INTEREST OR PLEASURE IN DOING THINGS: 0

## 2024-01-17 ASSESSMENT — ENCOUNTER SYMPTOMS
NAUSEA: 0
SHORTNESS OF BREATH: 1
CONSTIPATION: 0
RHINORRHEA: 0
BACK PAIN: 0
ABDOMINAL DISTENTION: 0
DIARRHEA: 0
SORE THROAT: 0
VOMITING: 0
ABDOMINAL PAIN: 0
CHEST TIGHTNESS: 0
COUGH: 1

## 2024-01-17 NOTE — PROGRESS NOTES
headaches. Negative for dizziness, weakness and light-headedness.   Hematological:  Negative for adenopathy.   Psychiatric/Behavioral:  Negative for agitation and behavioral problems. The patient is not nervous/anxious.      OBJECTIVE:  No vitals were taken during this encounter, as this is a virtual visit.    Physical Exam  Vitals reviewed: Vital signs unavailable, as this is a virtual visit.   Constitutional:       General: He is not in acute distress.     Appearance: Normal appearance. He is not ill-appearing or toxic-appearing.   Pulmonary:      Effort: No tachypnea or accessory muscle usage.      Breath sounds: Examination of the right-upper field reveals decreased breath sounds. Examination of the right-middle field reveals decreased breath sounds. Examination of the right-lower field reveals decreased breath sounds. Decreased breath sounds present.      Comments: Patient able to talk in full sentences without difficulty   Neurological:      General: No focal deficit present.      Mental Status: He is alert and oriented to person, place, and time.   Psychiatric:         Mood and Affect: Mood normal.         Speech: Speech normal.         Behavior: Behavior normal. Behavior is cooperative.       ASSESSMENT:   Diagnosis Orders   1. Bronchitis  amoxicillin-clavulanate (AUGMENTIN) 875-125 MG per tablet    predniSONE (DELTASONE) 20 MG tablet        PLAN:  1. Bronchitis  - Influenza in office negative  - Given above noted symptoms, will call in ATB and cough suppressant  - Prednisone daily x 5 days  - Symptomatic therapy suggested: push fluids, rest, use acetaminophen, ibuprofen and OTC Mucinex DM.    For fevers or pain: acetaminophen (Tylenol) or ibuprofen (Advil, Motrin) or naproxen (Aleve)   For congestion or sinus pressure: medications containing guaifenesin to help break up mucus, such as Mucinex or Robitussin, medications containing pseudoephedrine or phenylephrine, such as Sudafed and nasal steroid sprays,

## 2024-01-19 RX ORDER — FLUTICASONE PROPIONATE AND SALMETEROL 100; 50 UG/1; UG/1
1 POWDER RESPIRATORY (INHALATION) EVERY 12 HOURS
Qty: 3 EACH | Refills: 3 | Status: SHIPPED | OUTPATIENT
Start: 2024-01-19 | End: 2024-04-18

## 2024-01-24 ENCOUNTER — TELEMEDICINE (OUTPATIENT)
Dept: FAMILY MEDICINE CLINIC | Age: 29
End: 2024-01-24
Payer: COMMERCIAL

## 2024-01-24 ENCOUNTER — TELEPHONE (OUTPATIENT)
Dept: FAMILY MEDICINE CLINIC | Age: 29
End: 2024-01-24

## 2024-01-24 DIAGNOSIS — J45.20 INTERMITTENT ASTHMA, UNSPECIFIED ASTHMA SEVERITY, UNSPECIFIED WHETHER COMPLICATED: ICD-10-CM

## 2024-01-24 DIAGNOSIS — R05.1 ACUTE COUGH: Primary | ICD-10-CM

## 2024-01-24 PROCEDURE — 99213 OFFICE O/P EST LOW 20 MIN: CPT | Performed by: NURSE PRACTITIONER

## 2024-01-24 RX ORDER — MONTELUKAST SODIUM 10 MG/1
10 TABLET ORAL NIGHTLY
Qty: 30 TABLET | Refills: 0 | Status: SHIPPED | OUTPATIENT
Start: 2024-01-24

## 2024-01-24 ASSESSMENT — ENCOUNTER SYMPTOMS
SINUS PRESSURE: 1
COUGH: 1
SHORTNESS OF BREATH: 0
ABDOMINAL PAIN: 0
CHEST TIGHTNESS: 0
SORE THROAT: 0
NAUSEA: 0
BACK PAIN: 0
SINUS PAIN: 1
COLOR CHANGE: 0
RHINORRHEA: 0
DIARRHEA: 1
ABDOMINAL DISTENTION: 0

## 2024-01-24 NOTE — TELEPHONE ENCOUNTER
Patient scheduled.    Tetracycline Counseling: Patient counseled regarding possible photosensitivity and increased risk for sunburn.  Patient instructed to avoid sunlight, if possible.  When exposed to sunlight, patients should wear protective clothing, sunglasses, and sunscreen.  The patient was instructed to call the office immediately if the following severe adverse effects occur:  hearing changes, easy bruising/bleeding, severe headache, or vision changes.  The patient verbalized understanding of the proper use and possible adverse effects of tetracycline.  All of the patient's questions and concerns were addressed. Patient understands to avoid pregnancy while on therapy due to potential birth defects.

## 2024-01-24 NOTE — TELEPHONE ENCOUNTER
Patient was seen on the 17th of January or an Acute Visit. Patient is still experiencing a horrible cough (especially when laying down). Patient is still on the antibiotics, but the prednisone is done. Patient is asking what to do at this point?     Please Advise.

## 2024-01-24 NOTE — PROGRESS NOTES
HENT:  Positive for congestion (slight), sinus pressure (maxillary) and sinus pain (maxillary). Negative for ear pain, rhinorrhea and sore throat.    Respiratory:  Positive for cough. Negative for chest tightness and shortness of breath (improved).    Cardiovascular:  Negative for chest pain and palpitations.   Gastrointestinal:  Positive for diarrhea (with antibiotic). Negative for abdominal distention, abdominal pain, constipation and nausea.   Endocrine: Negative for polydipsia, polyphagia and polyuria.   Genitourinary:  Negative for difficulty urinating and dysuria.   Musculoskeletal:  Negative for arthralgias, back pain and myalgias.   Skin:  Negative for color change and rash.   Neurological:  Negative for dizziness, weakness, light-headedness and headaches.   Hematological:  Negative for adenopathy.   Psychiatric/Behavioral:  Negative for agitation and behavioral problems. The patient is not nervous/anxious.        Objective:     Allergies   Allergen Reactions    Seasonal    ,   Past Medical History:   Diagnosis Date    Acute bronchiolitis     Has a history of acute bronchiolitis    Asthma    ,   Past Surgical History:   Procedure Laterality Date    OTHER SURGICAL HISTORY      left foot, great toe. toe nail removal 2006    WISDOM TOOTH EXTRACTION     ,   Social History     Tobacco Use    Smoking status: Never    Smokeless tobacco: Former    Tobacco comments:     former Hookah smoker   Vaping Use    Vaping Use: Never used   Substance Use Topics    Alcohol use: No     Comment: socially    Drug use: No   ,   Family History   Problem Relation Age of Onset    High Blood Pressure Mother     COPD Maternal Grandmother    ,   Immunization History   Administered Date(s) Administered    TDaP, ADACEL (age 10y-64y), BOOSTRIX (age 10y+), IM, 0.5mL 11/08/2016   ,   Health Maintenance   Topic Date Due    Hepatitis B vaccine (1 of 3 - 3-dose series) Never done    COVID-19 Vaccine (1) Never done    Hepatitis C screen  Never

## 2024-02-12 DIAGNOSIS — R05.1 ACUTE COUGH: ICD-10-CM

## 2024-02-12 DIAGNOSIS — J45.20 INTERMITTENT ASTHMA, UNSPECIFIED ASTHMA SEVERITY, UNSPECIFIED WHETHER COMPLICATED: ICD-10-CM

## 2024-02-12 RX ORDER — MONTELUKAST SODIUM 10 MG/1
10 TABLET ORAL NIGHTLY
Qty: 30 TABLET | Refills: 0 | Status: SHIPPED | OUTPATIENT
Start: 2024-02-12

## 2024-03-01 ENCOUNTER — TELEPHONE (OUTPATIENT)
Dept: FAMILY MEDICINE CLINIC | Age: 29
End: 2024-03-01

## 2024-03-01 NOTE — TELEPHONE ENCOUNTER
Patient is questioning if the medication below should be refilled? Patient got an alert from the pharmacy that the medication was ready for refill. Patient thought his was only a 1 month supply - not long term.      Montelukast Sodium 10 mg Oral NIGHTLY

## 2024-03-24 DIAGNOSIS — J45.20 INTERMITTENT ASTHMA, UNSPECIFIED ASTHMA SEVERITY, UNSPECIFIED WHETHER COMPLICATED: ICD-10-CM

## 2024-03-24 DIAGNOSIS — R05.1 ACUTE COUGH: ICD-10-CM

## 2024-03-25 RX ORDER — MONTELUKAST SODIUM 10 MG/1
10 TABLET ORAL NIGHTLY
Qty: 90 TABLET | Refills: 3 | Status: SHIPPED | OUTPATIENT
Start: 2024-03-25

## 2024-07-02 ENCOUNTER — OFFICE VISIT (OUTPATIENT)
Dept: FAMILY MEDICINE CLINIC | Age: 29
End: 2024-07-02
Payer: COMMERCIAL

## 2024-07-02 VITALS
WEIGHT: 229 LBS | DIASTOLIC BLOOD PRESSURE: 78 MMHG | OXYGEN SATURATION: 98 % | SYSTOLIC BLOOD PRESSURE: 104 MMHG | HEART RATE: 46 BPM | HEIGHT: 71 IN | BODY MASS INDEX: 32.06 KG/M2

## 2024-07-02 DIAGNOSIS — R73.9 HYPERGLYCEMIA: ICD-10-CM

## 2024-07-02 DIAGNOSIS — L30.9 ECZEMA, UNSPECIFIED TYPE: ICD-10-CM

## 2024-07-02 DIAGNOSIS — Z00.00 ANNUAL PHYSICAL EXAM: Primary | ICD-10-CM

## 2024-07-02 DIAGNOSIS — Z00.00 PREVENTATIVE HEALTH CARE: ICD-10-CM

## 2024-07-02 DIAGNOSIS — J45.20 INTERMITTENT ASTHMA, UNSPECIFIED ASTHMA SEVERITY, UNSPECIFIED WHETHER COMPLICATED: ICD-10-CM

## 2024-07-02 DIAGNOSIS — E78.1 HIGH BLOOD TRIGLYCERIDES: ICD-10-CM

## 2024-07-02 PROCEDURE — 99395 PREV VISIT EST AGE 18-39: CPT | Performed by: NURSE PRACTITIONER

## 2024-07-02 RX ORDER — TRIAMCINOLONE ACETONIDE 1 MG/G
CREAM TOPICAL 2 TIMES DAILY
Qty: 80 G | Refills: 0 | Status: SHIPPED | OUTPATIENT
Start: 2024-07-02

## 2024-07-02 RX ORDER — KETOCONAZOLE 20 MG/ML
SHAMPOO TOPICAL
Qty: 120 ML | Refills: 3 | Status: SHIPPED | OUTPATIENT
Start: 2024-07-02

## 2024-07-02 SDOH — ECONOMIC STABILITY: FOOD INSECURITY: WITHIN THE PAST 12 MONTHS, YOU WORRIED THAT YOUR FOOD WOULD RUN OUT BEFORE YOU GOT MONEY TO BUY MORE.: NEVER TRUE

## 2024-07-02 SDOH — ECONOMIC STABILITY: INCOME INSECURITY: HOW HARD IS IT FOR YOU TO PAY FOR THE VERY BASICS LIKE FOOD, HOUSING, MEDICAL CARE, AND HEATING?: NOT HARD AT ALL

## 2024-07-02 SDOH — ECONOMIC STABILITY: FOOD INSECURITY: WITHIN THE PAST 12 MONTHS, THE FOOD YOU BOUGHT JUST DIDN'T LAST AND YOU DIDN'T HAVE MONEY TO GET MORE.: NEVER TRUE

## 2024-07-02 ASSESSMENT — ENCOUNTER SYMPTOMS
NAUSEA: 0
SORE THROAT: 0
ABDOMINAL DISTENTION: 0
RHINORRHEA: 0
CHEST TIGHTNESS: 0
CONSTIPATION: 0
ABDOMINAL PAIN: 0
VOMITING: 0
COUGH: 0
BACK PAIN: 0
SHORTNESS OF BREATH: 0
DIARRHEA: 0

## 2024-07-02 NOTE — PROGRESS NOTES
breath.    Cardiovascular:  Negative for chest pain and palpitations.   Gastrointestinal:  Negative for abdominal distention, abdominal pain, constipation, diarrhea, nausea and vomiting.   Endocrine: Negative for polydipsia, polyphagia and polyuria.   Genitourinary:  Negative for difficulty urinating and dysuria.   Musculoskeletal:  Negative for arthralgias, back pain and myalgias.   Skin:  Positive for rash (dry, itching, scaly patches to bilateral hands, cracking).   Neurological:  Negative for dizziness, weakness, light-headedness and headaches.   Hematological:  Negative for adenopathy.   Psychiatric/Behavioral:  Negative for agitation and behavioral problems. The patient is not nervous/anxious.      OBJECTIVE:  /78   Pulse (!) 46   Ht 1.803 m (5' 11\")   Wt 103.9 kg (229 lb)   SpO2 98%   BMI 31.94 kg/m²     Physical Exam  Vitals and nursing note reviewed.   Constitutional:       General: He is not in acute distress.     Appearance: Normal appearance. He is well-developed.   HENT:      Head: Normocephalic and atraumatic.   Cardiovascular:      Rate and Rhythm: Normal rate and regular rhythm.      Heart sounds: Normal heart sounds. No murmur heard.  Pulmonary:      Effort: Pulmonary effort is normal. No respiratory distress.      Breath sounds: Normal breath sounds.   Chest:      Chest wall: No tenderness.   Abdominal:      General: Bowel sounds are normal.      Palpations: Abdomen is soft.      Tenderness: There is no abdominal tenderness.   Musculoskeletal:         General: Normal range of motion.      Cervical back: Normal range of motion.      Right lower leg: No edema.      Left lower leg: No edema.   Skin:     General: Skin is warm and dry.      Findings: No rash.   Neurological:      Mental Status: He is alert and oriented to person, place, and time.   Psychiatric:         Mood and Affect: Mood normal.         Behavior: Behavior is cooperative.       ASSESSMENT:   Diagnosis Orders   1. Annual

## 2025-01-10 RX ORDER — ALBUTEROL SULFATE 90 UG/1
2 INHALANT RESPIRATORY (INHALATION) 4 TIMES DAILY PRN
Qty: 1 EACH | Refills: 0 | Status: SHIPPED | OUTPATIENT
Start: 2025-01-10

## 2025-02-05 ENCOUNTER — TELEMEDICINE (OUTPATIENT)
Dept: FAMILY MEDICINE CLINIC | Age: 30
End: 2025-02-05
Payer: COMMERCIAL

## 2025-02-05 ENCOUNTER — TELEPHONE (OUTPATIENT)
Dept: FAMILY MEDICINE CLINIC | Age: 30
End: 2025-02-05

## 2025-02-05 DIAGNOSIS — J40 BRONCHITIS: Primary | ICD-10-CM

## 2025-02-05 PROCEDURE — 99213 OFFICE O/P EST LOW 20 MIN: CPT | Performed by: NURSE PRACTITIONER

## 2025-02-05 RX ORDER — PREDNISONE 20 MG/1
40 TABLET ORAL DAILY
Qty: 10 TABLET | Refills: 0 | Status: SHIPPED | OUTPATIENT
Start: 2025-02-05 | End: 2025-02-10

## 2025-02-05 RX ORDER — AZITHROMYCIN 250 MG/1
TABLET, FILM COATED ORAL
Qty: 6 TABLET | Refills: 0 | Status: SHIPPED | OUTPATIENT
Start: 2025-02-05 | End: 2025-02-15

## 2025-02-05 RX ORDER — IBUPROFEN 800 MG/1
800 TABLET, FILM COATED ORAL EVERY 8 HOURS PRN
Qty: 60 TABLET | Refills: 0 | Status: SHIPPED | OUTPATIENT
Start: 2025-02-05

## 2025-02-05 SDOH — ECONOMIC STABILITY: FOOD INSECURITY: WITHIN THE PAST 12 MONTHS, YOU WORRIED THAT YOUR FOOD WOULD RUN OUT BEFORE YOU GOT MONEY TO BUY MORE.: NEVER TRUE

## 2025-02-05 SDOH — ECONOMIC STABILITY: FOOD INSECURITY: WITHIN THE PAST 12 MONTHS, THE FOOD YOU BOUGHT JUST DIDN'T LAST AND YOU DIDN'T HAVE MONEY TO GET MORE.: NEVER TRUE

## 2025-02-05 ASSESSMENT — ENCOUNTER SYMPTOMS
CHEST TIGHTNESS: 0
BACK PAIN: 0
RHINORRHEA: 0
SHORTNESS OF BREATH: 1
CONSTIPATION: 0
COUGH: 1
DIARRHEA: 0
SORE THROAT: 0
NAUSEA: 0
ABDOMINAL PAIN: 0
ABDOMINAL DISTENTION: 0
VOMITING: 0

## 2025-02-05 ASSESSMENT — PATIENT HEALTH QUESTIONNAIRE - PHQ9
SUM OF ALL RESPONSES TO PHQ QUESTIONS 1-9: 0
SUM OF ALL RESPONSES TO PHQ QUESTIONS 1-9: 0
SUM OF ALL RESPONSES TO PHQ9 QUESTIONS 1 & 2: 0
SUM OF ALL RESPONSES TO PHQ QUESTIONS 1-9: 0
2. FEELING DOWN, DEPRESSED OR HOPELESS: NOT AT ALL
1. LITTLE INTEREST OR PLEASURE IN DOING THINGS: NOT AT ALL
SUM OF ALL RESPONSES TO PHQ QUESTIONS 1-9: 0

## 2025-02-05 NOTE — TELEPHONE ENCOUNTER
Patient called in stating that he hasn't been feeling well for about 3 days now.  Patient states he has been taking Tylenol and albuterol for his symptoms.  Please advise.    Symptoms:    Cough  Congestion  Sore throat  Mild fevers (101-102)  Asthma Flare Ups  Body Aches/Chills

## 2025-02-05 NOTE — PROGRESS NOTES
time    --BENNETT Mart - CORDELIA on 2/5/2025 at 2:28 PM    An electronic signature was used to authenticate this note.

## 2025-02-11 DIAGNOSIS — J40 BRONCHITIS: ICD-10-CM

## 2025-02-11 RX ORDER — PREDNISONE 20 MG/1
20 TABLET ORAL DAILY
Qty: 5 TABLET | Refills: 0 | Status: SHIPPED | OUTPATIENT
Start: 2025-02-11 | End: 2025-02-16

## 2025-02-11 RX ORDER — AZITHROMYCIN 250 MG/1
TABLET, FILM COATED ORAL
Qty: 6 TABLET | Refills: 0 | Status: SHIPPED | OUTPATIENT
Start: 2025-02-11 | End: 2025-02-21

## 2025-02-11 NOTE — TELEPHONE ENCOUNTER
Patient states the medications started to work on the 4th day of the medications. He finished on Sunday but it still feels like there are rocks in his chest when he coughs. When he's breathing he can feel the mucus moving around. He's not sure if he just needs to stay on the medications for longer or another VV.     Last appt: 2/5/25  Next appt: n/a

## 2025-02-11 NOTE — TELEPHONE ENCOUNTER
I will send over another Z-pack. I will refill the Prednisone but only at the 20 mg dose. Please let him know that the cough can linger, even for months. If no improvement with this, he does need to be seen in the office.

## 2025-06-24 RX ORDER — ALBUTEROL SULFATE 90 UG/1
2 INHALANT RESPIRATORY (INHALATION) 4 TIMES DAILY PRN
Qty: 1 EACH | Refills: 0 | Status: SHIPPED | OUTPATIENT
Start: 2025-06-24

## 2025-09-04 RX ORDER — IBUPROFEN 800 MG/1
800 TABLET, FILM COATED ORAL EVERY 8 HOURS PRN
Qty: 60 TABLET | Refills: 0 | Status: SHIPPED | OUTPATIENT
Start: 2025-09-04

## 2025-09-04 RX ORDER — FLUTICASONE PROPIONATE AND SALMETEROL 100; 50 UG/1; UG/1
1 POWDER RESPIRATORY (INHALATION) EVERY 12 HOURS
Qty: 1 EACH | Refills: 0 | Status: SHIPPED | OUTPATIENT
Start: 2025-09-04 | End: 2025-12-03

## 2025-09-04 RX ORDER — ALBUTEROL SULFATE 90 UG/1
2 INHALANT RESPIRATORY (INHALATION) 4 TIMES DAILY PRN
Qty: 1 EACH | Refills: 0 | Status: SHIPPED | OUTPATIENT
Start: 2025-09-04